# Patient Record
Sex: FEMALE | Race: WHITE | NOT HISPANIC OR LATINO | Employment: UNEMPLOYED | URBAN - METROPOLITAN AREA
[De-identification: names, ages, dates, MRNs, and addresses within clinical notes are randomized per-mention and may not be internally consistent; named-entity substitution may affect disease eponyms.]

---

## 2017-07-12 ENCOUNTER — PATIENT MESSAGE (OUTPATIENT)
Dept: OBSTETRICS AND GYNECOLOGY | Facility: CLINIC | Age: 32
End: 2017-07-12

## 2017-07-14 ENCOUNTER — TELEPHONE (OUTPATIENT)
Dept: OBSTETRICS AND GYNECOLOGY | Facility: CLINIC | Age: 32
End: 2017-07-14

## 2017-07-14 ENCOUNTER — PATIENT MESSAGE (OUTPATIENT)
Dept: OBSTETRICS AND GYNECOLOGY | Facility: CLINIC | Age: 32
End: 2017-07-14

## 2017-07-14 DIAGNOSIS — R30.0 DYSURIA: Primary | ICD-10-CM

## 2017-07-14 RX ORDER — NITROFURANTOIN 25; 75 MG/1; MG/1
100 CAPSULE ORAL 2 TIMES DAILY
Qty: 14 CAPSULE | Refills: 0 | Status: SHIPPED | OUTPATIENT
Start: 2017-07-14 | End: 2017-07-21

## 2017-07-14 NOTE — TELEPHONE ENCOUNTER
Pt is scheduled to drop off urine culture tomorrow morning, but she is requesting abx to treat now. Advised her they may be changed.    Allergies and pharmacy reviewed.

## 2017-07-15 ENCOUNTER — LAB VISIT (OUTPATIENT)
Dept: LAB | Facility: HOSPITAL | Age: 32
End: 2017-07-15
Attending: OBSTETRICS & GYNECOLOGY
Payer: COMMERCIAL

## 2017-07-15 DIAGNOSIS — R30.0 DYSURIA: ICD-10-CM

## 2017-07-15 PROCEDURE — 87077 CULTURE AEROBIC IDENTIFY: CPT

## 2017-07-15 PROCEDURE — 87186 SC STD MICRODIL/AGAR DIL: CPT

## 2017-07-15 PROCEDURE — 87088 URINE BACTERIA CULTURE: CPT

## 2017-07-15 PROCEDURE — 87086 URINE CULTURE/COLONY COUNT: CPT

## 2017-07-18 LAB — BACTERIA UR CULT: NORMAL

## 2017-08-18 DIAGNOSIS — M25.562 PAIN IN BOTH KNEES, UNSPECIFIED CHRONICITY: Primary | ICD-10-CM

## 2017-08-18 DIAGNOSIS — M25.561 PAIN IN BOTH KNEES, UNSPECIFIED CHRONICITY: Primary | ICD-10-CM

## 2017-08-21 ENCOUNTER — OFFICE VISIT (OUTPATIENT)
Dept: ORTHOPEDICS | Facility: CLINIC | Age: 32
End: 2017-08-21
Payer: COMMERCIAL

## 2017-08-21 ENCOUNTER — HOSPITAL ENCOUNTER (OUTPATIENT)
Dept: RADIOLOGY | Facility: HOSPITAL | Age: 32
Discharge: HOME | End: 2017-08-21
Attending: ORTHOPAEDIC SURGERY
Payer: COMMERCIAL

## 2017-08-21 VITALS — BODY MASS INDEX: 27.79 KG/M2 | WEIGHT: 151 LBS | HEIGHT: 62 IN

## 2017-08-21 DIAGNOSIS — M25.561 PAIN IN BOTH KNEES, UNSPECIFIED CHRONICITY: ICD-10-CM

## 2017-08-21 DIAGNOSIS — M25.561 RIGHT KNEE PAIN, UNSPECIFIED CHRONICITY: Primary | ICD-10-CM

## 2017-08-21 DIAGNOSIS — M25.562 PAIN IN BOTH KNEES, UNSPECIFIED CHRONICITY: ICD-10-CM

## 2017-08-21 PROCEDURE — 99999 PR PBB SHADOW E&M-EST. PATIENT-LVL II: CPT | Mod: PBBFAC,,, | Performed by: ORTHOPAEDIC SURGERY

## 2017-08-21 PROCEDURE — 99203 OFFICE O/P NEW LOW 30 MIN: CPT | Mod: S$GLB,,, | Performed by: ORTHOPAEDIC SURGERY

## 2017-08-21 PROCEDURE — 73562 X-RAY EXAM OF KNEE 3: CPT | Mod: TC,50,PO

## 2017-08-21 PROCEDURE — 73562 X-RAY EXAM OF KNEE 3: CPT | Mod: 26,50,, | Performed by: RADIOLOGY

## 2017-08-21 PROCEDURE — 3008F BODY MASS INDEX DOCD: CPT | Mod: S$GLB,,, | Performed by: ORTHOPAEDIC SURGERY

## 2017-08-21 RX ORDER — IBUPROFEN 200 MG
200 TABLET ORAL EVERY 6 HOURS PRN
COMMUNITY
End: 2020-08-10

## 2017-08-21 RX ORDER — DIPHENHYDRAMINE HCL 25 MG
25 CAPSULE ORAL NIGHTLY
COMMUNITY

## 2017-08-21 NOTE — PROGRESS NOTES
Katina Cohen, 32 years old, right knee pain for three months' time, came on   gradually, 1/10 on the pain scale, most bothersome at the end of the day.  Rest   seems to help, not had any formal treatment.    Exam today shows she has full motion, good strength.  She does have tenderness   at the joint line.  Klarissa testing is positive.  Skin is intact.  Compartments   are soft.    X-rays overall show well-preserved joint spacing.    ASSESSMENT:  Right knee pain for three months', possible meniscal tear.    PLAN:  We will schedule her for an MRI of her knee.  See her back after that to   discuss different treatment options.      PBB/PN  dd: 2017 09:32:10 (CDT)  td: 2017 09:46:52 (CDT)  Doc ID   #[id number]  Job ID #[id number]    CC:     Further History  Aching pain  Worse with activity  Relieved with rest  No other associated symptoms  No other radiation    Further Exam  Alert and oriented  Pleasant  Contralateral limb has appropriate range of motion for age and condition  Contralateral limb has appropriate strength for age and condition  Contralateral limb has appropriate stability  for age and condition  No adenopathy  Pulses are appropriate for current condition  Skin is intact        Chief Complaint    Chief Complaint   Patient presents with    Right Knee - Pain       HPI  Katina Cohen is a 32 y.o.  female who presents with       Past Medical History  Past Medical History:   Diagnosis Date    Acid reflux     IBS (irritable bowel syndrome)        Past Surgical History  Past Surgical History:   Procedure Laterality Date     SECTION      3/26/12 at 35 weeks for  labor    FETAL MYELOMENINGOCELE REPAIR HYSTEROTOMY      left arm for birth related brachial plexus injury      TUBAL LIGATION  2014       Medications  Current Outpatient Prescriptions   Medication Sig    diphenhydrAMINE (BENADRYL) 25 mg capsule Take 25 mg by mouth every evening.    ibuprofen (ADVIL,MOTRIN) 200 MG  tablet Take 200 mg by mouth every 6 (six) hours as needed for Pain.    valacyclovir (VALTREX) 1000 MG tablet Take 1 tablet (1,000 mg total) by mouth 2 (two) times daily.     No current facility-administered medications for this visit.        Allergies  Review of patient's allergies indicates:   Allergen Reactions    Sulfa (sulfonamide antibiotics)        Family History  Family History   Problem Relation Age of Onset    Spina bifida Son     Breast cancer Neg Hx     Ovarian cancer Neg Hx        Social History  Social History     Social History    Marital status:      Spouse name: N/A    Number of children: N/A    Years of education: N/A     Occupational History    Not on file.     Social History Main Topics    Smoking status: Never Smoker    Smokeless tobacco: Never Used    Alcohol use No    Drug use: No    Sexual activity: Yes     Partners: Male     Birth control/ protection: OCP     Other Topics Concern    Not on file     Social History Narrative    No narrative on file               Review of Systems     Constitutional: Negative    HENT: Negative  Eyes: Negative  Respiratory: Negative  Cardiovascular: Negative  Musculoskeletal: HPI  Skin: Negative  Neurological: Negative  Hematological: Negative  Endocrine: Negative                 Physical Exam    There were no vitals filed for this visit.  Body mass index is 27.62 kg/m².  Physical Examination:     General appearance -  well appearing, and in no distress  Mental status - awake  Neck - supple  Chest -  symmetric air entry  Heart - normal rate   Abdomen - soft      Assessment     1. Right knee pain, unspecified chronicity          Plan

## 2017-12-05 ENCOUNTER — OFFICE VISIT (OUTPATIENT)
Dept: OBSTETRICS AND GYNECOLOGY | Facility: CLINIC | Age: 32
End: 2017-12-05
Payer: MEDICAID

## 2017-12-05 VITALS
DIASTOLIC BLOOD PRESSURE: 80 MMHG | WEIGHT: 158.75 LBS | BODY MASS INDEX: 29.21 KG/M2 | HEIGHT: 62 IN | SYSTOLIC BLOOD PRESSURE: 116 MMHG

## 2017-12-05 DIAGNOSIS — Z01.419 ENCOUNTER FOR GYNECOLOGICAL EXAMINATION WITHOUT ABNORMAL FINDING: Primary | ICD-10-CM

## 2017-12-05 DIAGNOSIS — N92.0 MENORRHAGIA WITH REGULAR CYCLE: ICD-10-CM

## 2017-12-05 PROCEDURE — 99213 OFFICE O/P EST LOW 20 MIN: CPT | Mod: PBBFAC,PN | Performed by: OBSTETRICS & GYNECOLOGY

## 2017-12-05 PROCEDURE — 99395 PREV VISIT EST AGE 18-39: CPT | Mod: S$PBB,,, | Performed by: OBSTETRICS & GYNECOLOGY

## 2017-12-05 PROCEDURE — 99999 PR PBB SHADOW E&M-EST. PATIENT-LVL III: CPT | Mod: PBBFAC,,, | Performed by: OBSTETRICS & GYNECOLOGY

## 2017-12-05 RX ORDER — HYDROGEN PEROXIDE 3 %
20 SOLUTION, NON-ORAL MISCELLANEOUS
COMMUNITY
End: 2019-03-26

## 2017-12-05 NOTE — PROGRESS NOTES
Chief Complaint   Patient presents with    Well Woman       History of Present Illness: Katina Cohen is a 32 y.o. female that presents today 2017 for well gyn visit. She reports heavy prolonged periods. She reports pad changes every 1 hour for days 2,3,4 with up to 14 days of light bleeding.     Past Medical History:   Diagnosis Date    Acid reflux     IBS (irritable bowel syndrome)        Past Surgical History:   Procedure Laterality Date     SECTION      3/26/12 at 35 weeks for  labor    FETAL MYELOMENINGOCELE REPAIR HYSTEROTOMY      left arm for birth related brachial plexus injury      TUBAL LIGATION  2014       Current Outpatient Prescriptions   Medication Sig Dispense Refill    diphenhydrAMINE (BENADRYL) 25 mg capsule Take 25 mg by mouth every evening.      esomeprazole (NEXIUM) 20 MG capsule Take 20 mg by mouth before breakfast.      ibuprofen (ADVIL,MOTRIN) 200 MG tablet Take 200 mg by mouth every 6 (six) hours as needed for Pain.       No current facility-administered medications for this visit.        Review of patient's allergies indicates:   Allergen Reactions    Sulfa (sulfonamide antibiotics)        Family History   Problem Relation Age of Onset    Spina bifida Son     Breast cancer Neg Hx     Ovarian cancer Neg Hx        Social History     Social History    Marital status:      Spouse name: N/A    Number of children: N/A    Years of education: N/A     Social History Main Topics    Smoking status: Never Smoker    Smokeless tobacco: Never Used    Alcohol use No    Drug use: No    Sexual activity: Yes     Partners: Male     Birth control/ protection: OCP     Other Topics Concern    None     Social History Narrative    None       OB History    Para Term  AB Living   2 2 1 1   2   SAB TAB Ectopic Multiple Live Births           2      # Outcome Date GA Lbr Jewel/2nd Weight Sex Delivery Anes PTL Lv   2 Term 06/23/15 37w0d  2.807 kg (6 lb  "3 oz) M CS-LTranv EPI N JAVAD   1  12 35w0d  2.381 kg (5 lb 4 oz) M CS-LTranv Spinal Y JAVAD      Birth Comments: with second trimester fetal surgery posterior hysterotomy repair           Review of Symptoms:  GENERAL: Denies weight gain or weight loss. Feeling well overall.   SKIN: Denies rash or lesions.   HEAD: Denies head injury or headache.   NODES: Denies enlarged lymph nodes.   CHEST: Denies chest pain or shortness of breath.   CARDIOVASCULAR: Denies palpitations or left sided chest pain.   ABDOMEN: No abdominal pain, constipation, diarrhea, nausea, vomiting or rectal bleeding.   URINARY: No frequency, dysuria, hematuria, or burning on urination.  HEMATOLOGIC: No easy bruisability or excessive bleeding.   MUSCULOSKELETAL: Denies joint pain or swelling.     /80   Ht 5' 2" (1.575 m)   Wt 72 kg (158 lb 11.7 oz)   LMP 2017   Physical Exam:  APPEARANCE: Well nourished, well developed, in no acute distress.  SKIN: Normal skin turgor, no lesions.  NECK: Neck symmetric without masses   RESPIRATORY: Normal respiratory effort with no retractions or use of accessory muscles  CARDIOVASCULAR: Peripheral vascular system with no swelling no varicosities and palpation of pulses normal  LYMPHATIC: No enlargements of the lymph nodes noted in the neck, axillae, or groin  ABDOMEN: Soft. No tenderness or masses. No hepatosplenomegaly. No hernias.  BREASTS: Symmetrical, no skin changes or visible lesions. No palpable masses, nipple discharge or adenopathy bilaterally.  PELVIC: Normal external female genitalia without lesions. Normal hair distribution. Adequate perineal body, normal urethral meatus. Urethra with no masses.  Bladder nontender. Vagina moist and well rugated without lesions or discharge. Cervix pink and without lesions. No significant cystocele or rectocele. Bimanual exam showed uterus normal size, shape, position, mobile and nontender. Adnexa without masses or tenderness. Urethra and bladder " normal.   EXTREMITIES: No clubbing cyanosis or edema.    ASSESSMENT/PLAN:  Encounter for gynecological examination without abnormal finding    Menorrhagia with regular cycle      IUD, Ablation and hysterectomy all discussed     Patient was counseled today on Pap guidelines, recommendation for pelvic exams, mammograms every other year after the age of 40 and annually after the age of 50, Colonoscopy after the age of 50, Dexa Bone Scan and calcium and vitamin D supplementation in menopause and to see her PCP for other health maintenance.   FOLLOW-UP:prn

## 2018-09-05 ENCOUNTER — TELEPHONE (OUTPATIENT)
Dept: OBSTETRICS AND GYNECOLOGY | Facility: CLINIC | Age: 33
End: 2018-09-05

## 2018-09-05 NOTE — TELEPHONE ENCOUNTER
----- Message from Ethel Medina sent at 9/5/2018 10:45 AM CDT -----  Contact: Self  Patient needs to schedule a Well woman exam  Epic wont let me schedule     Please call [phone] (home)

## 2018-10-19 ENCOUNTER — PATIENT MESSAGE (OUTPATIENT)
Dept: OBSTETRICS AND GYNECOLOGY | Facility: CLINIC | Age: 33
End: 2018-10-19

## 2018-10-22 RX ORDER — FLUCONAZOLE 150 MG/1
150 TABLET ORAL ONCE
Qty: 1 TABLET | Refills: 0 | Status: SHIPPED | OUTPATIENT
Start: 2018-10-22 | End: 2018-10-22

## 2018-10-23 ENCOUNTER — PATIENT MESSAGE (OUTPATIENT)
Dept: OBSTETRICS AND GYNECOLOGY | Facility: CLINIC | Age: 33
End: 2018-10-23

## 2018-12-06 ENCOUNTER — OFFICE VISIT (OUTPATIENT)
Dept: OBSTETRICS AND GYNECOLOGY | Facility: CLINIC | Age: 33
End: 2018-12-06
Payer: COMMERCIAL

## 2018-12-06 VITALS
DIASTOLIC BLOOD PRESSURE: 86 MMHG | BODY MASS INDEX: 29.88 KG/M2 | WEIGHT: 163.38 LBS | SYSTOLIC BLOOD PRESSURE: 122 MMHG

## 2018-12-06 DIAGNOSIS — N76.2 ACUTE VULVITIS: ICD-10-CM

## 2018-12-06 DIAGNOSIS — Z12.4 ENCOUNTER FOR PAP SMEAR OF CERVIX WITH HPV DNA COTESTING: Primary | ICD-10-CM

## 2018-12-06 LAB
CANDIDA RRNA VAG QL PROBE: NEGATIVE
G VAGINALIS RRNA GENITAL QL PROBE: NEGATIVE
T VAGINALIS RRNA GENITAL QL PROBE: NEGATIVE

## 2018-12-06 PROCEDURE — 88175 CYTOPATH C/V AUTO FLUID REDO: CPT

## 2018-12-06 PROCEDURE — 87660 TRICHOMONAS VAGIN DIR PROBE: CPT

## 2018-12-06 PROCEDURE — 87624 HPV HI-RISK TYP POOLED RSLT: CPT

## 2018-12-06 PROCEDURE — 99395 PREV VISIT EST AGE 18-39: CPT | Mod: S$GLB,,, | Performed by: OBSTETRICS & GYNECOLOGY

## 2018-12-06 PROCEDURE — 99999 PR PBB SHADOW E&M-EST. PATIENT-LVL III: CPT | Mod: PBBFAC,,, | Performed by: OBSTETRICS & GYNECOLOGY

## 2018-12-06 NOTE — PROGRESS NOTES
Chief Complaint   Patient presents with    Well Woman    Dyspareunia     pt states she had 3 months of painful intercourse, it has since resolved-- it felt better after she took diflucan        History of Present Illness: Katina Cohen is a 33 y.o. female that presents today 2018 for well gyn visit.    Past Medical History:   Diagnosis Date    Acid reflux     IBS (irritable bowel syndrome)        Past Surgical History:   Procedure Laterality Date     SECTION      3/26/12 at 35 weeks for  labor    FETAL MYELOMENINGOCELE REPAIR HYSTEROTOMY      left arm for birth related brachial plexus injury      TUBAL LIGATION  2014       Current Outpatient Medications   Medication Sig Dispense Refill    diphenhydrAMINE (BENADRYL) 25 mg capsule Take 25 mg by mouth every evening.      esomeprazole (NEXIUM) 20 MG capsule Take 20 mg by mouth before breakfast.      ibuprofen (ADVIL,MOTRIN) 200 MG tablet Take 200 mg by mouth every 6 (six) hours as needed for Pain.       No current facility-administered medications for this visit.        Review of patient's allergies indicates:   Allergen Reactions    Sulfa (sulfonamide antibiotics)        Family History   Problem Relation Age of Onset    Spina bifida Son     Breast cancer Neg Hx     Ovarian cancer Neg Hx        Social History     Socioeconomic History    Marital status:      Spouse name: None    Number of children: None    Years of education: None    Highest education level: None   Social Needs    Financial resource strain: None    Food insecurity - worry: None    Food insecurity - inability: None    Transportation needs - medical: None    Transportation needs - non-medical: None   Occupational History    None   Tobacco Use    Smoking status: Never Smoker    Smokeless tobacco: Never Used   Substance and Sexual Activity    Alcohol use: No    Drug use: No    Sexual activity: Yes     Partners: Male     Birth  control/protection: OCP   Other Topics Concern    None   Social History Narrative    None       OB History    Para Term  AB Living   2 2 1 1   2   SAB TAB Ectopic Multiple Live Births           2      # Outcome Date GA Lbr Jewel/2nd Weight Sex Delivery Anes PTL Lv   2 Term 06/23/15 37w0d  2.807 kg (6 lb 3 oz) M CS-LTranv EPI N JAVAD   1  12 35w0d  2.381 kg (5 lb 4 oz) M CS-LTranv Spinal Y JAVAD      Birth Comments: with second trimester fetal surgery posterior hysterotomy repair           Review of Symptoms:  GENERAL: Denies weight gain or weight loss. Feeling well overall.   SKIN: Denies rash or lesions.   HEAD: Denies head injury or headache.   NODES: Denies enlarged lymph nodes.   CHEST: Denies chest pain or shortness of breath.   CARDIOVASCULAR: Denies palpitations or left sided chest pain.   ABDOMEN: No abdominal pain, constipation, diarrhea, nausea, vomiting or rectal bleeding.   URINARY: No frequency, dysuria, hematuria, or burning on urination.  HEMATOLOGIC: No easy bruisability or excessive bleeding.   MUSCULOSKELETAL: Denies joint pain or swelling.     /86   Wt 74.1 kg (163 lb 5.8 oz)   LMP 2018   Physical Exam:  APPEARANCE: Well nourished, well developed, in no acute distress.  SKIN: Normal skin turgor, no lesions.  NECK: Neck symmetric without masses   RESPIRATORY: Normal respiratory effort with no retractions or use of accessory muscles  CARDIOVASCULAR: Peripheral vascular system with no swelling no varicosities and palpation of pulses normal  LYMPHATIC: No enlargements of the lymph nodes noted in the neck, axillae, or groin  ABDOMEN: Soft. No tenderness or masses. No hepatosplenomegaly. No hernias.  BREASTS: Symmetrical, no skin changes or visible lesions. No palpable masses, nipple discharge or adenopathy bilaterally.  PELVIC: Normal external female genitalia without lesions. Normal hair distribution. Adequate perineal body, normal urethral meatus. Urethra with no  masses.  Bladder nontender. Vagina moist and well rugated without lesions or discharge. Cervix pink and without lesions. No significant cystocele or rectocele. Bimanual exam showed uterus normal size, shape, position, mobile and nontender. Adnexa without masses or tenderness. Urethra and bladder normal.   EXTREMITIES: No clubbing cyanosis or edema.    ASSESSMENT/PLAN:  Encounter for Pap smear of cervix with HPV DNA cotesting  -     Liquid-based pap smear, screening  -     HPV High Risk Genotypes, PCR    Acute vulvitis  -     Vaginosis Screen by DNA Probe          Patient was counseled today on Pap guidelines, recommendation for pelvic exams, mammograms every other year after the age of 40 and annually after the age of 50, Colonoscopy after the age of 50, Dexa Bone Scan and calcium and vitamin D supplementation in menopause and to see her PCP for other health maintenance.   FOLLOW-UP:prn

## 2018-12-12 LAB
HPV HR 12 DNA CVX QL NAA+PROBE: NEGATIVE
HPV16 AG SPEC QL: NEGATIVE
HPV18 DNA SPEC QL NAA+PROBE: NEGATIVE

## 2019-01-29 ENCOUNTER — PATIENT MESSAGE (OUTPATIENT)
Dept: OBSTETRICS AND GYNECOLOGY | Facility: CLINIC | Age: 34
End: 2019-01-29

## 2019-01-29 DIAGNOSIS — R39.89 POSSIBLE URINARY TRACT INFECTION: Primary | ICD-10-CM

## 2019-01-30 ENCOUNTER — PATIENT MESSAGE (OUTPATIENT)
Dept: OBSTETRICS AND GYNECOLOGY | Facility: CLINIC | Age: 34
End: 2019-01-30

## 2019-01-30 ENCOUNTER — LAB VISIT (OUTPATIENT)
Dept: LAB | Facility: HOSPITAL | Age: 34
End: 2019-01-30
Attending: OBSTETRICS & GYNECOLOGY
Payer: COMMERCIAL

## 2019-01-30 DIAGNOSIS — R39.89 POSSIBLE URINARY TRACT INFECTION: ICD-10-CM

## 2019-01-30 LAB
BACTERIA #/AREA URNS HPF: ABNORMAL /HPF
BILIRUB UR QL STRIP: NEGATIVE
CLARITY UR: ABNORMAL
COLOR UR: YELLOW
GLUCOSE UR QL STRIP: NEGATIVE
HGB UR QL STRIP: ABNORMAL
KETONES UR QL STRIP: NEGATIVE
LEUKOCYTE ESTERASE UR QL STRIP: ABNORMAL
MICROSCOPIC COMMENT: ABNORMAL
NITRITE UR QL STRIP: NEGATIVE
PH UR STRIP: 7 [PH] (ref 5–8)
PROT UR QL STRIP: NEGATIVE
RBC #/AREA URNS HPF: 5 /HPF (ref 0–4)
SP GR UR STRIP: 1.01 (ref 1–1.03)
URN SPEC COLLECT METH UR: ABNORMAL
WBC #/AREA URNS HPF: 20 /HPF (ref 0–5)

## 2019-01-30 PROCEDURE — 87088 URINE BACTERIA CULTURE: CPT

## 2019-01-30 PROCEDURE — 87077 CULTURE AEROBIC IDENTIFY: CPT

## 2019-01-30 PROCEDURE — 81000 URINALYSIS NONAUTO W/SCOPE: CPT | Mod: PO

## 2019-01-30 PROCEDURE — 87186 SC STD MICRODIL/AGAR DIL: CPT

## 2019-01-30 PROCEDURE — 87086 URINE CULTURE/COLONY COUNT: CPT

## 2019-01-31 ENCOUNTER — PATIENT MESSAGE (OUTPATIENT)
Dept: OBSTETRICS AND GYNECOLOGY | Facility: CLINIC | Age: 34
End: 2019-01-31

## 2019-01-31 RX ORDER — NITROFURANTOIN 25; 75 MG/1; MG/1
100 CAPSULE ORAL 2 TIMES DAILY
Qty: 14 CAPSULE | Refills: 0 | Status: SHIPPED | OUTPATIENT
Start: 2019-01-31 | End: 2019-02-07

## 2019-02-01 ENCOUNTER — PATIENT MESSAGE (OUTPATIENT)
Dept: OBSTETRICS AND GYNECOLOGY | Facility: CLINIC | Age: 34
End: 2019-02-01

## 2019-02-01 LAB — BACTERIA UR CULT: NORMAL

## 2019-03-25 ENCOUNTER — PATIENT MESSAGE (OUTPATIENT)
Dept: OBSTETRICS AND GYNECOLOGY | Facility: CLINIC | Age: 34
End: 2019-03-25

## 2019-03-26 ENCOUNTER — OFFICE VISIT (OUTPATIENT)
Dept: OBSTETRICS AND GYNECOLOGY | Facility: CLINIC | Age: 34
End: 2019-03-26
Payer: COMMERCIAL

## 2019-03-26 VITALS
HEIGHT: 62 IN | WEIGHT: 164 LBS | BODY MASS INDEX: 30.18 KG/M2 | DIASTOLIC BLOOD PRESSURE: 62 MMHG | SYSTOLIC BLOOD PRESSURE: 110 MMHG

## 2019-03-26 DIAGNOSIS — R30.0 DYSURIA: ICD-10-CM

## 2019-03-26 DIAGNOSIS — L25.9 CONTACT DERMATITIS, UNSPECIFIED CONTACT DERMATITIS TYPE, UNSPECIFIED TRIGGER: Primary | ICD-10-CM

## 2019-03-26 DIAGNOSIS — L70.9 ACNE, UNSPECIFIED ACNE TYPE: ICD-10-CM

## 2019-03-26 DIAGNOSIS — N92.0 MENORRHAGIA WITH REGULAR CYCLE: ICD-10-CM

## 2019-03-26 PROCEDURE — 87510 GARDNER VAG DNA DIR PROBE: CPT

## 2019-03-26 PROCEDURE — 3008F BODY MASS INDEX DOCD: CPT | Mod: CPTII,S$GLB,, | Performed by: OBSTETRICS & GYNECOLOGY

## 2019-03-26 PROCEDURE — 3008F PR BODY MASS INDEX (BMI) DOCUMENTED: ICD-10-PCS | Mod: CPTII,S$GLB,, | Performed by: OBSTETRICS & GYNECOLOGY

## 2019-03-26 PROCEDURE — 87086 URINE CULTURE/COLONY COUNT: CPT

## 2019-03-26 PROCEDURE — 99213 PR OFFICE/OUTPT VISIT, EST, LEVL III, 20-29 MIN: ICD-10-PCS | Mod: S$GLB,,, | Performed by: OBSTETRICS & GYNECOLOGY

## 2019-03-26 PROCEDURE — 99213 OFFICE O/P EST LOW 20 MIN: CPT | Mod: S$GLB,,, | Performed by: OBSTETRICS & GYNECOLOGY

## 2019-03-26 PROCEDURE — 99999 PR PBB SHADOW E&M-EST. PATIENT-LVL III: ICD-10-PCS | Mod: PBBFAC,,, | Performed by: OBSTETRICS & GYNECOLOGY

## 2019-03-26 PROCEDURE — 99999 PR PBB SHADOW E&M-EST. PATIENT-LVL III: CPT | Mod: PBBFAC,,, | Performed by: OBSTETRICS & GYNECOLOGY

## 2019-03-26 PROCEDURE — 87480 CANDIDA DNA DIR PROBE: CPT

## 2019-03-26 RX ORDER — NORETHINDRONE ACETATE AND ETHINYL ESTRADIOL 1MG-20(21)
1 KIT ORAL DAILY
Qty: 30 TABLET | Refills: 11 | Status: SHIPPED | OUTPATIENT
Start: 2019-03-26 | End: 2019-08-20 | Stop reason: ALTCHOICE

## 2019-03-26 RX ORDER — TRIAMCINOLONE ACETONIDE 1 MG/G
CREAM TOPICAL 2 TIMES DAILY
Qty: 80 G | Refills: 1 | Status: SHIPPED | OUTPATIENT
Start: 2019-03-26 | End: 2019-09-10

## 2019-03-26 NOTE — PROGRESS NOTES
Chief Complaint   Patient presents with    possible yeast    possible UTI       History of Present Illness: Katina Cohen is a 33 y.o. female that presents today 3/26/2019 for   Chief Complaint   Patient presents with    possible yeast    possible UTI         Past Medical History:   Diagnosis Date    Acid reflux     IBS (irritable bowel syndrome)        Past Surgical History:   Procedure Laterality Date     SECTION      3/26/12 at 35 weeks for  labor    FETAL MYELOMENINGOCELE REPAIR HYSTEROTOMY      left arm for birth related brachial plexus injury      TUBAL LIGATION  2014       Current Outpatient Medications   Medication Sig Dispense Refill    diphenhydrAMINE (BENADRYL) 25 mg capsule Take 25 mg by mouth every evening.      ibuprofen (ADVIL,MOTRIN) 200 MG tablet Take 200 mg by mouth every 6 (six) hours as needed for Pain.      norethindrone-ethinyl estradiol (JUNEL FE 1/20) 1 mg-20 mcg (21)/75 mg (7) per tablet Take 1 tablet by mouth once daily. 30 tablet 11    triamcinolone acetonide 0.1% (KENALOG) 0.1 % cream Apply topically 2 (two) times daily. 45 g 1     No current facility-administered medications for this visit.        Review of patient's allergies indicates:   Allergen Reactions    Sulfa (sulfonamide antibiotics)        Family History   Problem Relation Age of Onset    Spina bifida Son     Breast cancer Neg Hx     Ovarian cancer Neg Hx        Social History     Tobacco Use    Smoking status: Never Smoker    Smokeless tobacco: Never Used   Substance Use Topics    Alcohol use: No    Drug use: No       OB History    Para Term  AB Living   2 2 1 1   2   SAB TAB Ectopic Multiple Live Births           2      # Outcome Date GA Lbr Jewel/2nd Weight Sex Delivery Anes PTL Lv   2 Term 06/23/15 37w0d  2.807 kg (6 lb 3 oz) M CS-LTranv EPI N JAVAD   1  12 35w0d  2.381 kg (5 lb 4 oz) M CS-LTranv Spinal Y JAVAD      Birth Comments: with second  "trimester fetal surgery posterior hysterotomy repair        Review of Symptoms:  GENERAL: Denies weight gain or weight loss. Feeling well overall.   SKIN: Denies rash or lesions.   HEAD: Denies head injury or headache.   NODES: Denies enlarged lymph nodes.   CHEST: Denies chest pain or shortness of breath.   CARDIOVASCULAR: Denies palpitations or left sided chest pain.   ABDOMEN: No abdominal pain, constipation, diarrhea, nausea, vomiting or rectal bleeding.   URINARY: No frequency, dysuria, hematuria, or burning on urination.  HEMATOLOGIC: No easy bruisability or excessive bleeding.   MUSCULOSKELETAL: Denies joint pain or swelling.     /62   Ht 5' 2" (1.575 m)   Wt 74.4 kg (164 lb 0.4 oz)   LMP 03/12/2019   Physical Exam:  APPEARANCE: Well nourished, well developed, in no acute distress.  SKIN: Normal skin turgor, no lesions.  NECK: Neck symmetric without masses   RESPIRATORY: Normal respiratory effort with no retractions or use of accessory muscles  CARDIOVASCULAR: Peripheral vascular system with no swelling no varicosities and palpation of pulses normal  LYMPHATIC: No enlargements of the lymph nodes noted in the neck, axillae, or groin  ABDOMEN: Soft. No tenderness or masses. No hepatosplenomegaly. No hernias.  PELVIC: Normal external female genitalia with erythema of the labia +++   Normal hair distribution. Adequate perineal body, normal urethral meatus. Urethra with no masses.  Bladder nontender. Vagina moist and well rugated without lesions or discharge. Cervix pink and without lesions. No significant cystocele or rectocele. Bimanual exam showed uterus normal size, shape, position, mobile and nontender. Adnexa without masses or tenderness. Urethra and bladder normal.  EXTREMITIES: No clubbing cyanosis or edema.    ASSESSMENT/PLAN:  Contact dermatitis, unspecified contact dermatitis type, unspecified trigger  -     triamcinolone acetonide 0.1% (KENALOG) 0.1 % cream; Apply topically 2 (two) times " daily.  Dispense: 45 g; Refill: 1    Dysuria  -     Urine culture  -     Vaginosis Screen by DNA Probe    Acne, unspecified acne type  -     norethindrone-ethinyl estradiol (JUNEL FE 1/20) 1 mg-20 mcg (21)/75 mg (7) per tablet; Take 1 tablet by mouth once daily.  Dispense: 30 tablet; Refill: 11    Menorrhagia with regular cycle  -     norethindrone-ethinyl estradiol (JUNEL FE 1/20) 1 mg-20 mcg (21)/75 mg (7) per tablet; Take 1 tablet by mouth once daily.  Dispense: 30 tablet; Refill: 11        15 minutes spent today with this patient. Greater than half spent in counseling today.

## 2019-03-27 LAB
BACTERIA UR CULT: NO GROWTH
BACTERIAL VAGINOSIS DNA: NEGATIVE
CANDIDA GLABRATA DNA: NEGATIVE
CANDIDA KRUSEI DNA: NEGATIVE
CANDIDA RRNA VAG QL PROBE: POSITIVE
T VAGINALIS RRNA GENITAL QL PROBE: NEGATIVE

## 2019-04-03 ENCOUNTER — PATIENT MESSAGE (OUTPATIENT)
Dept: OBSTETRICS AND GYNECOLOGY | Facility: CLINIC | Age: 34
End: 2019-04-03

## 2019-04-04 ENCOUNTER — PATIENT MESSAGE (OUTPATIENT)
Dept: OBSTETRICS AND GYNECOLOGY | Facility: CLINIC | Age: 34
End: 2019-04-04

## 2019-04-04 RX ORDER — TERCONAZOLE 4 MG/G
1 CREAM VAGINAL NIGHTLY
Qty: 45 G | Refills: 0 | Status: SHIPPED | OUTPATIENT
Start: 2019-04-04 | End: 2019-04-16

## 2019-04-05 ENCOUNTER — PATIENT MESSAGE (OUTPATIENT)
Dept: OBSTETRICS AND GYNECOLOGY | Facility: CLINIC | Age: 34
End: 2019-04-05

## 2019-09-04 ENCOUNTER — PATIENT MESSAGE (OUTPATIENT)
Dept: OBSTETRICS AND GYNECOLOGY | Facility: CLINIC | Age: 34
End: 2019-09-04

## 2019-09-04 DIAGNOSIS — R39.89 POSSIBLE URINARY TRACT INFECTION: Primary | ICD-10-CM

## 2019-09-05 ENCOUNTER — LAB VISIT (OUTPATIENT)
Dept: LAB | Facility: HOSPITAL | Age: 34
End: 2019-09-05
Attending: OBSTETRICS & GYNECOLOGY
Payer: COMMERCIAL

## 2019-09-05 ENCOUNTER — PATIENT MESSAGE (OUTPATIENT)
Dept: OBSTETRICS AND GYNECOLOGY | Facility: CLINIC | Age: 34
End: 2019-09-05

## 2019-09-05 DIAGNOSIS — R39.89 POSSIBLE URINARY TRACT INFECTION: ICD-10-CM

## 2019-09-05 PROCEDURE — 87086 URINE CULTURE/COLONY COUNT: CPT

## 2019-09-06 LAB — BACTERIA UR CULT: NORMAL

## 2019-09-06 RX ORDER — NITROFURANTOIN 25; 75 MG/1; MG/1
100 CAPSULE ORAL 2 TIMES DAILY
Qty: 14 CAPSULE | Refills: 0 | Status: SHIPPED | OUTPATIENT
Start: 2019-09-06 | End: 2019-09-13

## 2019-09-10 ENCOUNTER — OFFICE VISIT (OUTPATIENT)
Dept: OBSTETRICS AND GYNECOLOGY | Facility: CLINIC | Age: 34
End: 2019-09-10
Payer: COMMERCIAL

## 2019-09-10 ENCOUNTER — LAB VISIT (OUTPATIENT)
Dept: LAB | Facility: HOSPITAL | Age: 34
End: 2019-09-10
Attending: OBSTETRICS & GYNECOLOGY
Payer: COMMERCIAL

## 2019-09-10 ENCOUNTER — PATIENT MESSAGE (OUTPATIENT)
Dept: OBSTETRICS AND GYNECOLOGY | Facility: CLINIC | Age: 34
End: 2019-09-10

## 2019-09-10 VITALS
HEIGHT: 62 IN | DIASTOLIC BLOOD PRESSURE: 82 MMHG | BODY MASS INDEX: 30.07 KG/M2 | SYSTOLIC BLOOD PRESSURE: 122 MMHG | WEIGHT: 163.38 LBS

## 2019-09-10 DIAGNOSIS — R39.15 URINARY URGENCY: Primary | ICD-10-CM

## 2019-09-10 DIAGNOSIS — N92.0 MENORRHAGIA WITH REGULAR CYCLE: ICD-10-CM

## 2019-09-10 DIAGNOSIS — L70.9 ACNE, UNSPECIFIED ACNE TYPE: ICD-10-CM

## 2019-09-10 DIAGNOSIS — N92.1 MENORRHAGIA WITH IRREGULAR CYCLE: ICD-10-CM

## 2019-09-10 DIAGNOSIS — D21.9 FIBROID: ICD-10-CM

## 2019-09-10 LAB
ALBUMIN SERPL BCP-MCNC: 4.3 G/DL (ref 3.5–5.2)
ALP SERPL-CCNC: 62 U/L (ref 55–135)
ALT SERPL W/O P-5'-P-CCNC: 21 U/L (ref 10–44)
ANION GAP SERPL CALC-SCNC: 10 MMOL/L (ref 8–16)
AST SERPL-CCNC: 25 U/L (ref 10–40)
BASOPHILS # BLD AUTO: 0.02 K/UL (ref 0–0.2)
BASOPHILS NFR BLD: 0.3 % (ref 0–1.9)
BILIRUB SERPL-MCNC: 0.2 MG/DL (ref 0.1–1)
BUN SERPL-MCNC: 11 MG/DL (ref 6–20)
CALCIUM SERPL-MCNC: 9.9 MG/DL (ref 8.7–10.5)
CHLORIDE SERPL-SCNC: 104 MMOL/L (ref 95–110)
CO2 SERPL-SCNC: 22 MMOL/L (ref 23–29)
CREAT SERPL-MCNC: 0.8 MG/DL (ref 0.5–1.4)
DIFFERENTIAL METHOD: ABNORMAL
EOSINOPHIL # BLD AUTO: 0.2 K/UL (ref 0–0.5)
EOSINOPHIL NFR BLD: 3.1 % (ref 0–8)
ERYTHROCYTE [DISTWIDTH] IN BLOOD BY AUTOMATED COUNT: 12 % (ref 11.5–14.5)
EST. GFR  (AFRICAN AMERICAN): >60 ML/MIN/1.73 M^2
EST. GFR  (NON AFRICAN AMERICAN): >60 ML/MIN/1.73 M^2
GLUCOSE SERPL-MCNC: 98 MG/DL (ref 70–110)
HCT VFR BLD AUTO: 38.8 % (ref 37–48.5)
HGB BLD-MCNC: 12.8 G/DL (ref 12–16)
IMM GRANULOCYTES # BLD AUTO: 0.01 K/UL (ref 0–0.04)
IMM GRANULOCYTES NFR BLD AUTO: 0.2 % (ref 0–0.5)
IRON SERPL-MCNC: 75 UG/DL (ref 30–160)
LYMPHOCYTES # BLD AUTO: 2.1 K/UL (ref 1–4.8)
LYMPHOCYTES NFR BLD: 33.2 % (ref 18–48)
MCH RBC QN AUTO: 31.6 PG (ref 27–31)
MCHC RBC AUTO-ENTMCNC: 33 G/DL (ref 32–36)
MCV RBC AUTO: 96 FL (ref 82–98)
MONOCYTES # BLD AUTO: 0.6 K/UL (ref 0.3–1)
MONOCYTES NFR BLD: 8.9 % (ref 4–15)
NEUTROPHILS # BLD AUTO: 3.4 K/UL (ref 1.8–7.7)
NEUTROPHILS NFR BLD: 54.3 % (ref 38–73)
NRBC BLD-RTO: 0 /100 WBC
PLATELET # BLD AUTO: 277 K/UL (ref 150–350)
PMV BLD AUTO: 10.9 FL (ref 9.2–12.9)
POTASSIUM SERPL-SCNC: 3.9 MMOL/L (ref 3.5–5.1)
PROT SERPL-MCNC: 8.1 G/DL (ref 6–8.4)
RBC # BLD AUTO: 4.05 M/UL (ref 4–5.4)
SATURATED IRON: 15 % (ref 20–50)
SODIUM SERPL-SCNC: 136 MMOL/L (ref 136–145)
TOTAL IRON BINDING CAPACITY: 488 UG/DL (ref 250–450)
TRANSFERRIN SERPL-MCNC: 330 MG/DL (ref 200–375)
WBC # BLD AUTO: 6.2 K/UL (ref 3.9–12.7)

## 2019-09-10 PROCEDURE — 99999 PR PBB SHADOW E&M-EST. PATIENT-LVL III: CPT | Mod: PBBFAC,,, | Performed by: OBSTETRICS & GYNECOLOGY

## 2019-09-10 PROCEDURE — 3008F BODY MASS INDEX DOCD: CPT | Mod: CPTII,S$GLB,, | Performed by: OBSTETRICS & GYNECOLOGY

## 2019-09-10 PROCEDURE — 3008F PR BODY MASS INDEX (BMI) DOCUMENTED: ICD-10-PCS | Mod: CPTII,S$GLB,, | Performed by: OBSTETRICS & GYNECOLOGY

## 2019-09-10 PROCEDURE — 99213 PR OFFICE/OUTPT VISIT, EST, LEVL III, 20-29 MIN: ICD-10-PCS | Mod: S$GLB,,, | Performed by: OBSTETRICS & GYNECOLOGY

## 2019-09-10 PROCEDURE — 36415 COLL VENOUS BLD VENIPUNCTURE: CPT | Mod: PO

## 2019-09-10 PROCEDURE — 80053 COMPREHEN METABOLIC PANEL: CPT

## 2019-09-10 PROCEDURE — 85025 COMPLETE CBC W/AUTO DIFF WBC: CPT

## 2019-09-10 PROCEDURE — 99213 OFFICE O/P EST LOW 20 MIN: CPT | Mod: S$GLB,,, | Performed by: OBSTETRICS & GYNECOLOGY

## 2019-09-10 PROCEDURE — 99999 PR PBB SHADOW E&M-EST. PATIENT-LVL III: ICD-10-PCS | Mod: PBBFAC,,, | Performed by: OBSTETRICS & GYNECOLOGY

## 2019-09-10 PROCEDURE — 83540 ASSAY OF IRON: CPT

## 2019-09-10 PROCEDURE — 84443 ASSAY THYROID STIM HORMONE: CPT

## 2019-09-10 NOTE — PROGRESS NOTES
Chief Complaint   Patient presents with    left sample for possible UTI last week    started antibiotic friday    still in pain       History of Present Illness: Katina Cohen is a 34 y.o. female that presents today 9/10/2019 for   Chief Complaint   Patient presents with    left sample for possible UTI last week    started antibiotic friday    still in pain   she reports no relief from ABX. She reports urgency and frequency of urination and dysuria.  She reports that she is having more issues with her periods.  She reports bleeding for up to 2 weeks.  She reports that she bleeds very heavily.  She reports ovulation pain is causing her pelvic pain and rectum pain. She reports changing tampon and pad every 30 minutes for first 3 days with more clotting.  She reports history of fibroid      Past Medical History:   Diagnosis Date    Acid reflux     IBS (irritable bowel syndrome)        Past Surgical History:   Procedure Laterality Date     SECTION      3/26/12 at 35 weeks for  labor    FETAL MYELOMENINGOCELE REPAIR HYSTEROTOMY      left arm for birth related brachial plexus injury      TUBAL LIGATION  2014       Current Outpatient Medications   Medication Sig Dispense Refill    diphenhydrAMINE (BENADRYL) 25 mg capsule Take 25 mg by mouth every evening.      ibuprofen (ADVIL,MOTRIN) 200 MG tablet Take 200 mg by mouth every 6 (six) hours as needed for Pain.      phenazopyridine HCl (AZO ORAL) Take by mouth.      nitrofurantoin, macrocrystal-monohydrate, (MACROBID) 100 MG capsule Take 1 capsule (100 mg total) by mouth 2 (two) times daily for 7 days 14 capsule 0     No current facility-administered medications for this visit.        Review of patient's allergies indicates:   Allergen Reactions    Sulfa (sulfonamide antibiotics)        Family History   Problem Relation Age of Onset    Spina bifida Son     Breast cancer Neg Hx     Ovarian cancer Neg Hx        Social History  "    Tobacco Use    Smoking status: Never Smoker    Smokeless tobacco: Never Used   Substance Use Topics    Alcohol use: No    Drug use: No       OB History    Para Term  AB Living   2 2 1 1   2   SAB TAB Ectopic Multiple Live Births           2      # Outcome Date GA Lbr Jewel/2nd Weight Sex Delivery Anes PTL Lv   2 Term 06/23/15 37w0d  2.807 kg (6 lb 3 oz) M CS-LTranv EPI N JAVAD   1  12 35w0d  2.381 kg (5 lb 4 oz) M CS-LTranv Spinal Y JAVAD      Birth Comments: with second trimester fetal surgery posterior hysterotomy repair        Review of Symptoms:  GENERAL: Denies weight gain or weight loss. Feeling well overall.   SKIN: Denies rash or lesions.   HEAD: Denies head injury or headache.   NODES: Denies enlarged lymph nodes.   CHEST: Denies chest pain or shortness of breath.   CARDIOVASCULAR: Denies palpitations or left sided chest pain.   ABDOMEN: No abdominal pain, constipation, diarrhea, nausea, vomiting or rectal bleeding.   URINARY: ++ urgency and frequency with  dysuria, no hematuria  HEMATOLOGIC: No easy bruisability or excessive bleeding.   MUSCULOSKELETAL: Denies joint pain or swelling.     /82   Ht 5' 2" (1.575 m)   Wt 74.1 kg (163 lb 5.8 oz)   LMP 2019     ASSESSMENT/PLAN:  Urinary urgency    Menorrhagia with irregular cycle  -     CBC auto differential; Future; Expected date: 09/10/2019  -     Iron and TIBC; Future; Expected date: 09/10/2019  -     Comprehensive metabolic panel; Future; Expected date: 09/10/2019  -     TSH; Future; Expected date: 09/10/2019  -     US Pelvis Comp with Transvag NON-OB (xpd; Future; Expected date: 09/10/2019    Fibroid  -     US Pelvis Comp with Transvag NON-OB (xpd; Future; Expected date: 09/10/2019      15 minutes spent today with this patient. Greater than half spent in counseling today.         "

## 2019-09-11 LAB — TSH SERPL DL<=0.005 MIU/L-ACNC: 1.15 UIU/ML (ref 0.4–4)

## 2019-09-12 ENCOUNTER — HOSPITAL ENCOUNTER (OUTPATIENT)
Dept: RADIOLOGY | Facility: HOSPITAL | Age: 34
Discharge: HOME | End: 2019-09-12
Attending: OBSTETRICS & GYNECOLOGY
Payer: COMMERCIAL

## 2019-09-12 DIAGNOSIS — N92.1 MENORRHAGIA WITH IRREGULAR CYCLE: ICD-10-CM

## 2019-09-12 DIAGNOSIS — D21.9 FIBROID: ICD-10-CM

## 2019-09-12 PROCEDURE — 76830 US PELVIS COMP WITH TRANSVAG NON-OB (XPD): ICD-10-PCS | Mod: 26,,, | Performed by: RADIOLOGY

## 2019-09-12 PROCEDURE — 76830 TRANSVAGINAL US NON-OB: CPT | Mod: 26,,, | Performed by: RADIOLOGY

## 2019-09-12 PROCEDURE — 76856 US PELVIS COMP WITH TRANSVAG NON-OB (XPD): ICD-10-PCS | Mod: 26,,, | Performed by: RADIOLOGY

## 2019-09-12 PROCEDURE — 76830 TRANSVAGINAL US NON-OB: CPT | Mod: TC,PN

## 2019-09-12 PROCEDURE — 76856 US EXAM PELVIC COMPLETE: CPT | Mod: 26,,, | Performed by: RADIOLOGY

## 2019-09-13 RX ORDER — DIAZEPAM 5 MG/1
TABLET ORAL
Qty: 1 TABLET | Refills: 0 | Status: SHIPPED | OUTPATIENT
Start: 2019-09-13 | End: 2020-04-28

## 2019-09-17 RX ORDER — NORETHINDRONE ACETATE AND ETHINYL ESTRADIOL 1MG-20(21)
1 KIT ORAL DAILY
Qty: 30 TABLET | Refills: 11 | Status: SHIPPED | OUTPATIENT
Start: 2019-09-17 | End: 2019-12-02 | Stop reason: SDUPTHER

## 2019-10-14 NOTE — PROGRESS NOTES
Note:  Patient is the daughter of Gerald Chang    CHIEF COMPLAINT:    Mrs. Cohen is a 34 y.o. female presenting for a consultation at the request of Dr. Shavon Mascorro. Patient presents with urinary urgency.    PRESENTING ILLNESS:    Katina Cohen is a 34 y.o. female who states she has isolated urinary urgency.  She states she had the first UTI she ever had in January of this year.  She had urgency, frequency symptoms, was treated with abx.  She felt like symptoms never fully resolved.  She then had vaginal irritation and was treated with 3 rounds of diflucan and the vaginitis symptoms resolved.  She then felt like she had another UTI, and pushed fluids then had isolated urgency.  She states she does not have to urinate frequently, that she often does not drink enough fluids as she is busy with her 5 year old and 7 year old sons.  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  Though she has a family history of IC, she has no dyspareunia, the bladder does not feel better with emptying.  It actually feels better when full.  She denies any gross hematuria, other pain issues.  She is very stressed out stating that they are now living with her sister, though she and her  are moving with their sons to a new home.  She presently has frequency x 4-5, the urgency gets worse between 4 and 7 pm.  She changed her diet to a gluten free diet.  She also states that she stopped drinking coffee.  She is very stressed out.      REVIEW OF SYSTEMS:    Review of Systems   Constitutional: Negative.    HENT: Negative.    Eyes: Negative.    Cardiovascular: Negative.    Gastrointestinal: Negative.    Genitourinary: Positive for urgency. Negative for dysuria, flank pain, frequency and hematuria.   Musculoskeletal: Negative.    Skin: Negative.    Neurological: Negative.    Endo/Heme/Allergies: Negative.    Psychiatric/Behavioral: The patient is nervous/anxious.        PATIENT HISTORY:    Past Medical History:   Diagnosis Date    Acid reflux      IBS (irritable bowel syndrome)        Past Surgical History:   Procedure Laterality Date     SECTION      3/26/12 at 35 weeks for  labor    FETAL MYELOMENINGOCELE REPAIR HYSTEROTOMY      left arm for birth related brachial plexus injury      TUBAL LIGATION  2014       Family History   Problem Relation Age of Onset    Spina bifida Son      Socioeconomic History    Marital status:    Tobacco Use    Smoking status: Never Smoker    Smokeless tobacco: Never Used   Substance and Sexual Activity    Alcohol use: No    Drug use: No    Sexual activity: Yes     Partners: Male     Birth control/protection: OCP       Allergies:  Sulfa (sulfonamide antibiotics)    Medications:  Outpatient Encounter Medications as of 10/17/2019   Medication Sig Dispense Refill    diphenhydrAMINE (BENADRYL) 25 mg capsule Take 25 mg by mouth every evening.      ibuprofen (ADVIL,MOTRIN) 200 MG tablet Take 200 mg by mouth every 6 (six) hours as needed for Pain.      norethindrone-ethinyl estradiol (JUNEL FE 1/20) 1 mg-20 mcg (21)/75 mg (7) per tablet Take 1 tablet by mouth once daily. 30 tablet 11    phenazopyridine HCl (AZO ORAL) Take by mouth.      diazePAM (VALIUM) 5 MG tablet Take 2 hours before and have a ride (Patient not taking: Reported on 10/17/2019) 1 tablet 0     No facility-administered encounter medications on file as of 10/17/2019.          PHYSICAL EXAMINATION:    The patient generally appears in good health, is appropriately interactive, and is in no apparent distress.    Skin: No lesions.    Mental: Cooperative with normal affect.    Neuro: Grossly intact.    HEENT: Normal. No evidence of lymphadenopathy.    Chest:  normal inspiratory effort.    Abdomen:  Soft, non-tender. No masses or organomegaly. Bladder is not palpable. No evidence of flank discomfort. No evidence of inguinal hernia.    Extremities: No clubbing, cyanosis, or edema      LABS:    Lab Results   Component Value Date     BUN 11 09/10/2019    CREATININE 0.8 09/10/2019     UA 1.000, pH 7, otherwise, negative    IMPRESSION:    Encounter Diagnoses   Name Primary?    Urinary urgency Yes       PLAN:    1. Discussed that the mechanisms are likely multifactoral.  She is feeling stressed about the upcoming move.  Discussed that a dilute urine will make the urgency worse  2.  Bladder Matters book given  3.  If the urgency persists after she moves, three months after the move.

## 2019-10-17 ENCOUNTER — OFFICE VISIT (OUTPATIENT)
Dept: UROLOGY | Facility: CLINIC | Age: 34
End: 2019-10-17
Payer: COMMERCIAL

## 2019-10-17 VITALS
WEIGHT: 153.69 LBS | SYSTOLIC BLOOD PRESSURE: 132 MMHG | BODY MASS INDEX: 28.28 KG/M2 | DIASTOLIC BLOOD PRESSURE: 88 MMHG | HEART RATE: 82 BPM | HEIGHT: 62 IN

## 2019-10-17 DIAGNOSIS — R39.15 URINARY URGENCY: Primary | ICD-10-CM

## 2019-10-17 PROCEDURE — 81002 PR URINALYSIS NONAUTO W/O SCOPE: ICD-10-PCS | Mod: S$GLB,,, | Performed by: UROLOGY

## 2019-10-17 PROCEDURE — 99205 PR OFFICE/OUTPT VISIT, NEW, LEVL V, 60-74 MIN: ICD-10-PCS | Mod: 25,S$GLB,, | Performed by: UROLOGY

## 2019-10-17 PROCEDURE — 81002 URINALYSIS NONAUTO W/O SCOPE: CPT | Mod: S$GLB,,, | Performed by: UROLOGY

## 2019-10-17 PROCEDURE — 99999 PR PBB SHADOW E&M-EST. PATIENT-LVL III: ICD-10-PCS | Mod: PBBFAC,,, | Performed by: UROLOGY

## 2019-10-17 PROCEDURE — 3008F PR BODY MASS INDEX (BMI) DOCUMENTED: ICD-10-PCS | Mod: CPTII,S$GLB,, | Performed by: UROLOGY

## 2019-10-17 PROCEDURE — 99999 PR PBB SHADOW E&M-EST. PATIENT-LVL III: CPT | Mod: PBBFAC,,, | Performed by: UROLOGY

## 2019-10-17 PROCEDURE — 99205 OFFICE O/P NEW HI 60 MIN: CPT | Mod: 25,S$GLB,, | Performed by: UROLOGY

## 2019-10-17 PROCEDURE — 3008F BODY MASS INDEX DOCD: CPT | Mod: CPTII,S$GLB,, | Performed by: UROLOGY

## 2019-12-02 ENCOUNTER — OFFICE VISIT (OUTPATIENT)
Dept: OBSTETRICS AND GYNECOLOGY | Facility: CLINIC | Age: 34
End: 2019-12-02
Payer: COMMERCIAL

## 2019-12-02 VITALS
BODY MASS INDEX: 26.53 KG/M2 | DIASTOLIC BLOOD PRESSURE: 76 MMHG | SYSTOLIC BLOOD PRESSURE: 124 MMHG | WEIGHT: 144.19 LBS | HEIGHT: 62 IN

## 2019-12-02 DIAGNOSIS — L70.9 ACNE, UNSPECIFIED ACNE TYPE: ICD-10-CM

## 2019-12-02 DIAGNOSIS — N92.0 MENORRHAGIA WITH REGULAR CYCLE: ICD-10-CM

## 2019-12-02 DIAGNOSIS — N76.0 ACUTE VAGINITIS: Primary | ICD-10-CM

## 2019-12-02 PROCEDURE — 99999 PR PBB SHADOW E&M-EST. PATIENT-LVL III: CPT | Mod: PBBFAC,,, | Performed by: OBSTETRICS & GYNECOLOGY

## 2019-12-02 PROCEDURE — 99999 PR PBB SHADOW E&M-EST. PATIENT-LVL III: ICD-10-PCS | Mod: PBBFAC,,, | Performed by: OBSTETRICS & GYNECOLOGY

## 2019-12-02 PROCEDURE — 3008F BODY MASS INDEX DOCD: CPT | Mod: CPTII,S$GLB,, | Performed by: OBSTETRICS & GYNECOLOGY

## 2019-12-02 PROCEDURE — 3008F PR BODY MASS INDEX (BMI) DOCUMENTED: ICD-10-PCS | Mod: CPTII,S$GLB,, | Performed by: OBSTETRICS & GYNECOLOGY

## 2019-12-02 PROCEDURE — 87801 DETECT AGNT MULT DNA AMPLI: CPT

## 2019-12-02 PROCEDURE — 87481 CANDIDA DNA AMP PROBE: CPT | Mod: 59

## 2019-12-02 PROCEDURE — 99213 PR OFFICE/OUTPT VISIT, EST, LEVL III, 20-29 MIN: ICD-10-PCS | Mod: S$GLB,,, | Performed by: OBSTETRICS & GYNECOLOGY

## 2019-12-02 PROCEDURE — 99213 OFFICE O/P EST LOW 20 MIN: CPT | Mod: S$GLB,,, | Performed by: OBSTETRICS & GYNECOLOGY

## 2019-12-02 RX ORDER — NORETHINDRONE ACETATE AND ETHINYL ESTRADIOL 1MG-20(21)
1 KIT ORAL DAILY
Qty: 30 TABLET | Refills: 11 | Status: SHIPPED | OUTPATIENT
Start: 2019-12-02 | End: 2020-04-28

## 2019-12-02 NOTE — PROGRESS NOTES
Chief Complaint   Patient presents with    vaginal irritation       History of Present Illness: Katina Cohen is a 34 y.o. female that presents today 2019 for   Chief Complaint   Patient presents with    vaginal irritation     She says worse in the evening.  She reports vaginal irritation worse before her period and best during the period.  She says mild discharge.  Skin irritation with discharge touching it.     Past Medical History:   Diagnosis Date    Acid reflux     IBS (irritable bowel syndrome)        Past Surgical History:   Procedure Laterality Date     SECTION      3/26/12 at 35 weeks for  labor    FETAL MYELOMENINGOCELE REPAIR HYSTEROTOMY      left arm for birth related brachial plexus injury      TUBAL LIGATION  2014       Current Outpatient Medications   Medication Sig Dispense Refill    diphenhydrAMINE (BENADRYL) 25 mg capsule Take 25 mg by mouth every evening.      ibuprofen (ADVIL,MOTRIN) 200 MG tablet Take 200 mg by mouth every 6 (six) hours as needed for Pain.      norethindrone-ethinyl estradiol (JUNEL FE 1/20) 1 mg-20 mcg (21)/75 mg (7) per tablet Take 1 tablet by mouth once daily. 30 tablet 11    diazePAM (VALIUM) 5 MG tablet Take 2 hours before and have a ride (Patient not taking: Reported on 10/17/2019) 1 tablet 0    phenazopyridine HCl (AZO ORAL) Take by mouth.       No current facility-administered medications for this visit.        Review of patient's allergies indicates:   Allergen Reactions    Sulfa (sulfonamide antibiotics)        Family History   Problem Relation Age of Onset    Spina bifida Son     Breast cancer Neg Hx     Ovarian cancer Neg Hx        Social History     Tobacco Use    Smoking status: Never Smoker    Smokeless tobacco: Never Used   Substance Use Topics    Alcohol use: No    Drug use: No       OB History    Para Term  AB Living   2 2 1 1   2   SAB TAB Ectopic Multiple Live Births           2      #  "Outcome Date GA Lbr Jewel/2nd Weight Sex Delivery Anes PTL Lv   2 Term 06/23/15 37w0d  2.807 kg (6 lb 3 oz) M CS-LTranv EPI N JAVAD   1  12 35w0d  2.381 kg (5 lb 4 oz) M CS-LTranv Spinal Y JAVAD      Birth Comments: with second trimester fetal surgery posterior hysterotomy repair        Review of Symptoms:  GENERAL: Denies weight gain or weight loss. Feeling well overall.   SKIN: Denies rash or lesions.   HEAD: Denies head injury or headache.   NODES: Denies enlarged lymph nodes.   CHEST: Denies chest pain or shortness of breath.   CARDIOVASCULAR: Denies palpitations or left sided chest pain.   ABDOMEN: No abdominal pain, constipation, diarrhea, nausea, vomiting or rectal bleeding.   URINARY: No frequency, dysuria, hematuria, or burning on urination.  HEMATOLOGIC: No easy bruisability or excessive bleeding.   MUSCULOSKELETAL: Denies joint pain or swelling.     /76   Ht 5' 2" (1.575 m)   Wt 65.4 kg (144 lb 2.9 oz)   LMP 2019   Physical Exam:  APPEARANCE: Well nourished, well developed, in no acute distress.  SKIN: Normal skin turgor, no lesions.  NECK: Neck symmetric without masses   RESPIRATORY: Normal respiratory effort with no retractions or use of accessory muscles  CARDIOVASCULAR: Peripheral vascular system with no swelling no varicosities and palpation of pulses normal  LYMPHATIC: No enlargements of the lymph nodes noted in the neck, axillae, or groin  ABDOMEN: Soft. No tenderness or masses. No hepatosplenomegaly. No hernias.  PELVIC: Normal external female genitalia without lesions. Normal hair distribution. Adequate perineal body, normal urethral meatus. Urethra with no masses.  Bladder nontender. Vagina moist and well rugated without lesions or discharge. Cervix pink and without lesions. No significant cystocele or rectocele. Bimanual exam showed uterus normal size, shape, position, mobile and nontender. Adnexa without masses or tenderness. Urethra and bladder normal.  EXTREMITIES: No " clubbing cyanosis or edema.    ASSESSMENT/PLAN:  Acute vaginitis  -     Vaginosis Screen by DNA Probe    Acne, unspecified acne type  -     norethindrone-ethinyl estradiol (JUNEL FE 1/20) 1 mg-20 mcg (21)/75 mg (7) per tablet; Take 1 tablet by mouth once daily.  Dispense: 30 tablet; Refill: 11    Menorrhagia with regular cycle  -     norethindrone-ethinyl estradiol (JUNEL FE 1/20) 1 mg-20 mcg (21)/75 mg (7) per tablet; Take 1 tablet by mouth once daily.  Dispense: 30 tablet; Refill: 11      15 minutes spent today with this patient. Greater than half spent in counseling today.

## 2019-12-03 LAB
BACTERIAL VAGINOSIS DNA: NEGATIVE
CANDIDA GLABRATA DNA: NEGATIVE
CANDIDA KRUSEI DNA: NEGATIVE
CANDIDA RRNA VAG QL PROBE: NEGATIVE
T VAGINALIS RRNA GENITAL QL PROBE: NEGATIVE

## 2020-01-13 ENCOUNTER — PATIENT MESSAGE (OUTPATIENT)
Dept: UROLOGY | Facility: CLINIC | Age: 35
End: 2020-01-13

## 2020-01-13 DIAGNOSIS — R39.15 URINARY URGENCY: Primary | ICD-10-CM

## 2020-01-13 DIAGNOSIS — R35.0 INCREASED FREQUENCY OF URINATION: ICD-10-CM

## 2020-02-04 ENCOUNTER — PATIENT MESSAGE (OUTPATIENT)
Dept: UROLOGY | Facility: CLINIC | Age: 35
End: 2020-02-04

## 2020-02-24 ENCOUNTER — PATIENT MESSAGE (OUTPATIENT)
Dept: OBSTETRICS AND GYNECOLOGY | Facility: CLINIC | Age: 35
End: 2020-02-24

## 2020-02-24 ENCOUNTER — PATIENT MESSAGE (OUTPATIENT)
Dept: UROLOGY | Facility: CLINIC | Age: 35
End: 2020-02-24

## 2020-02-24 DIAGNOSIS — R30.0 DYSURIA: Primary | ICD-10-CM

## 2020-02-26 ENCOUNTER — LAB VISIT (OUTPATIENT)
Dept: LAB | Facility: HOSPITAL | Age: 35
End: 2020-02-26
Attending: UROLOGY
Payer: COMMERCIAL

## 2020-02-26 DIAGNOSIS — R30.0 DYSURIA: ICD-10-CM

## 2020-02-26 PROCEDURE — 87086 URINE CULTURE/COLONY COUNT: CPT

## 2020-02-27 LAB — BACTERIA UR CULT: NO GROWTH

## 2020-04-28 ENCOUNTER — PATIENT MESSAGE (OUTPATIENT)
Dept: OBSTETRICS AND GYNECOLOGY | Facility: CLINIC | Age: 35
End: 2020-04-28

## 2020-04-28 ENCOUNTER — OFFICE VISIT (OUTPATIENT)
Dept: OBSTETRICS AND GYNECOLOGY | Facility: CLINIC | Age: 35
End: 2020-04-28
Payer: COMMERCIAL

## 2020-04-28 VITALS — BODY MASS INDEX: 23.29 KG/M2 | WEIGHT: 126.56 LBS | HEIGHT: 62 IN

## 2020-04-28 DIAGNOSIS — Z15.89 METHYLENETETRAHYDROFOLATE REDUCTASE (MTHFR) GENE MUTATION: ICD-10-CM

## 2020-04-28 DIAGNOSIS — N76.0 ACUTE VAGINITIS: Primary | ICD-10-CM

## 2020-04-28 PROCEDURE — 99213 OFFICE O/P EST LOW 20 MIN: CPT | Mod: S$GLB,,, | Performed by: OBSTETRICS & GYNECOLOGY

## 2020-04-28 PROCEDURE — 99999 PR PBB SHADOW E&M-EST. PATIENT-LVL III: ICD-10-PCS | Mod: PBBFAC,,, | Performed by: OBSTETRICS & GYNECOLOGY

## 2020-04-28 PROCEDURE — 87661 TRICHOMONAS VAGINALIS AMPLIF: CPT

## 2020-04-28 PROCEDURE — 99213 PR OFFICE/OUTPT VISIT, EST, LEVL III, 20-29 MIN: ICD-10-PCS | Mod: S$GLB,,, | Performed by: OBSTETRICS & GYNECOLOGY

## 2020-04-28 PROCEDURE — 87481 CANDIDA DNA AMP PROBE: CPT | Mod: 59

## 2020-04-28 PROCEDURE — 99999 PR PBB SHADOW E&M-EST. PATIENT-LVL III: CPT | Mod: PBBFAC,,, | Performed by: OBSTETRICS & GYNECOLOGY

## 2020-04-28 PROCEDURE — 3008F PR BODY MASS INDEX (BMI) DOCUMENTED: ICD-10-PCS | Mod: CPTII,S$GLB,, | Performed by: OBSTETRICS & GYNECOLOGY

## 2020-04-28 PROCEDURE — 87801 DETECT AGNT MULT DNA AMPLI: CPT

## 2020-04-28 PROCEDURE — 3008F BODY MASS INDEX DOCD: CPT | Mod: CPTII,S$GLB,, | Performed by: OBSTETRICS & GYNECOLOGY

## 2020-04-28 RX ORDER — CLOBETASOL PROPIONATE 0.5 MG/G
CREAM TOPICAL 2 TIMES DAILY
Qty: 45 G | Refills: 1 | Status: SHIPPED | OUTPATIENT
Start: 2020-04-28 | End: 2020-05-18 | Stop reason: SDUPTHER

## 2020-04-28 RX ORDER — HYDROXYZINE PAMOATE 25 MG/1
25 CAPSULE ORAL EVERY 12 HOURS
Qty: 60 CAPSULE | Refills: 1 | Status: SHIPPED | OUTPATIENT
Start: 2020-04-28 | End: 2020-08-10

## 2020-04-28 NOTE — PROGRESS NOTES
Chief Complaint   Patient presents with    Vaginal Pain       History of Present Illness: Katina Cohen is a 35 y.o. female that presents today 2020 for   Chief Complaint   Patient presents with    Vaginal Pain   she reports that she has been trying elavil for relief with no results.   She reports that feels depressed and that she can't take care of her kids.  She reports that she has constant burning externally.  She reports that she does not have pain at night.  She reports that it resolves at the beginning of the period then it gets worse at the end of the period.  She reports moderate burning pain consistently       Past Medical History:   Diagnosis Date    Acid reflux     IBS (irritable bowel syndrome)        Past Surgical History:   Procedure Laterality Date     SECTION      3/26/12 at 35 weeks for  labor    FETAL MYELOMENINGOCELE REPAIR HYSTEROTOMY      left arm for birth related brachial plexus injury      TUBAL LIGATION  2014       Current Outpatient Medications   Medication Sig Dispense Refill    amitriptyline (ELAVIL) 25 MG tablet Take 1 tablet by mouth at bedtime 30 tablet 1    diphenhydrAMINE (BENADRYL) 25 mg capsule Take 25 mg by mouth every evening.      ibuprofen (ADVIL,MOTRIN) 200 MG tablet Take 200 mg by mouth every 6 (six) hours as needed for Pain.      clobetasoL (TEMOVATE) 0.05 % cream Apply topically 2 (two) times daily. 45 g 1    hydrOXYzine pamoate (VISTARIL) 25 MG Cap Take 1 capsule (25 mg total) by mouth every 12 (twelve) hours. 60 capsule 1     No current facility-administered medications for this visit.        Review of patient's allergies indicates:   Allergen Reactions    Sulfa (sulfonamide antibiotics)        Family History   Problem Relation Age of Onset    Spina bifida Son     Breast cancer Neg Hx     Ovarian cancer Neg Hx        Social History     Tobacco Use    Smoking status: Never Smoker    Smokeless tobacco: Never Used  "  Substance Use Topics    Alcohol use: No    Drug use: No       OB History    Para Term  AB Living   2 2 1 1   2   SAB TAB Ectopic Multiple Live Births           2      # Outcome Date GA Lbr Jewel/2nd Weight Sex Delivery Anes PTL Lv   2 Term 06/23/15 37w0d  2.807 kg (6 lb 3 oz) M CS-LTranv EPI N JAVAD   1  12 35w0d  2.381 kg (5 lb 4 oz) M CS-LTranv Spinal Y JAVAD      Birth Comments: with second trimester fetal surgery posterior hysterotomy repair        Review of Symptoms:  GENERAL: Denies weight gain or weight loss. Feeling well overall.   SKIN: Denies rash or lesions.   HEAD: Denies head injury or headache.   NODES: Denies enlarged lymph nodes.   CHEST: Denies chest pain or shortness of breath.   CARDIOVASCULAR: Denies palpitations or left sided chest pain.   ABDOMEN: No abdominal pain, constipation, diarrhea, nausea, vomiting or rectal bleeding.   URINARY: No frequency, dysuria, hematuria, or burning on urination.  HEMATOLOGIC: No easy bruisability or excessive bleeding.   MUSCULOSKELETAL: Denies joint pain or swelling.     Ht 5' 2" (1.575 m)   Wt 57.4 kg (126 lb 8.7 oz)   LMP 2020   Physical Exam:  APPEARANCE: Well nourished, well developed, in no acute distress.  SKIN: Normal skin turgor, no lesions.  NECK: Neck symmetric without masses   RESPIRATORY: Normal respiratory effort with no retractions or use of accessory muscles  CARDIOVASCULAR: Peripheral vascular system with no swelling no varicosities and palpation of pulses normal  LYMPHATIC: No enlargements of the lymph nodes noted in the neck, axillae, or groin  ABDOMEN: Soft. No tenderness or masses. No hepatosplenomegaly. No hernias.  PELVIC: Normal external female genitalia without lesions. Normal hair distribution. Adequate perineal body, normal urethral meatus. Urethra with no masses.  Bladder nontender. Vagina moist and well rugated without lesions or discharge. Cervix pink and without lesions. No significant cystocele or " rectocele. Bimanual exam showed uterus normal size, shape, position, mobile and nontender. Adnexa without masses or tenderness. Urethra and bladder normal.  EXTREMITIES: No clubbing cyanosis or edema.    ASSESSMENT/PLAN:  Acute vaginitis  -     hydrOXYzine pamoate (VISTARIL) 25 MG Cap; Take 1 capsule (25 mg total) by mouth every 12 (twelve) hours.  Dispense: 60 capsule; Refill: 1  -     Vaginosis Screen by DNA Probe  -     clobetasoL (TEMOVATE) 0.05 % cream; Apply topically 2 (two) times daily.  Dispense: 45 g; Refill: 1    Methylenetetrahydrofolate reductase (MTHFR) gene mutation      15 minutes spent today with this patient. Greater than half spent in counseling today.

## 2020-05-08 ENCOUNTER — PATIENT MESSAGE (OUTPATIENT)
Dept: OBSTETRICS AND GYNECOLOGY | Facility: CLINIC | Age: 35
End: 2020-05-08

## 2020-05-08 RX ORDER — GABAPENTIN 300 MG/1
300 CAPSULE ORAL 3 TIMES DAILY
Qty: 90 CAPSULE | Refills: 2 | Status: SHIPPED | OUTPATIENT
Start: 2020-05-08 | End: 2020-08-10 | Stop reason: SDUPTHER

## 2020-05-08 RX ORDER — ESTRADIOL 0.1 MG/G
1 CREAM VAGINAL
Qty: 42.5 G | Refills: 1 | Status: SHIPPED | OUTPATIENT
Start: 2020-05-11 | End: 2020-08-10 | Stop reason: SDUPTHER

## 2020-05-18 DIAGNOSIS — N76.0 ACUTE VAGINITIS: ICD-10-CM

## 2020-05-18 RX ORDER — CLOBETASOL PROPIONATE 0.5 MG/G
CREAM TOPICAL 2 TIMES DAILY
Qty: 45 G | Refills: 1 | Status: SHIPPED | OUTPATIENT
Start: 2020-05-18 | End: 2020-08-10

## 2020-06-16 ENCOUNTER — PATIENT MESSAGE (OUTPATIENT)
Dept: OBSTETRICS AND GYNECOLOGY | Facility: CLINIC | Age: 35
End: 2020-06-16

## 2020-06-16 DIAGNOSIS — N76.0 ACUTE VAGINITIS: Primary | ICD-10-CM

## 2020-06-17 ENCOUNTER — PATIENT MESSAGE (OUTPATIENT)
Dept: OBSTETRICS AND GYNECOLOGY | Facility: CLINIC | Age: 35
End: 2020-06-17

## 2020-08-10 ENCOUNTER — OFFICE VISIT (OUTPATIENT)
Dept: OBSTETRICS AND GYNECOLOGY | Facility: CLINIC | Age: 35
End: 2020-08-10
Payer: COMMERCIAL

## 2020-08-10 DIAGNOSIS — Z15.89 METHYLENETETRAHYDROFOLATE REDUCTASE (MTHFR) GENE MUTATION: ICD-10-CM

## 2020-08-10 DIAGNOSIS — Z30.9 ENCOUNTER FOR CONTRACEPTIVE MANAGEMENT, UNSPECIFIED TYPE: Primary | ICD-10-CM

## 2020-08-10 DIAGNOSIS — B37.9 CANDIDIASIS: ICD-10-CM

## 2020-08-10 DIAGNOSIS — N76.2 ACUTE VULVITIS: ICD-10-CM

## 2020-08-10 PROCEDURE — 99213 OFFICE O/P EST LOW 20 MIN: CPT | Mod: 95,,, | Performed by: OBSTETRICS & GYNECOLOGY

## 2020-08-10 PROCEDURE — 99213 PR OFFICE/OUTPT VISIT, EST, LEVL III, 20-29 MIN: ICD-10-PCS | Mod: 95,,, | Performed by: OBSTETRICS & GYNECOLOGY

## 2020-08-10 RX ORDER — LEVONORGESTREL / ETHINYL ESTRADIOL AND ETHINYL ESTRADIOL 150-30(84)
1 KIT ORAL DAILY
Qty: 91 EACH | Refills: 3 | Status: SHIPPED | OUTPATIENT
Start: 2020-08-10 | End: 2021-07-10 | Stop reason: SDUPTHER

## 2020-08-10 RX ORDER — ESTRADIOL 0.1 MG/G
1 CREAM VAGINAL
Qty: 42.5 G | Refills: 1 | Status: SHIPPED | OUTPATIENT
Start: 2020-08-10 | End: 2023-04-25

## 2020-08-10 RX ORDER — GABAPENTIN 300 MG/1
300 CAPSULE ORAL 3 TIMES DAILY
Qty: 90 CAPSULE | Refills: 2 | Status: SHIPPED | OUTPATIENT
Start: 2020-08-10 | End: 2021-01-06 | Stop reason: SDUPTHER

## 2020-08-10 RX ORDER — FLUCONAZOLE 100 MG/1
100 TABLET ORAL DAILY
Qty: 3 TABLET | Refills: 0 | Status: SHIPPED | OUTPATIENT
Start: 2020-08-10 | End: 2020-08-13

## 2020-08-10 NOTE — PROGRESS NOTES
The patient location is: home  The chief complaint leading to consultation is:   Chief Complaint   Patient presents with    Contraception     Visit type: Virtual visit with synchronous audio and video  Total time spent with patient: 15  Each patient to whom he or she provides medical services by telemedicine is:  (1) informed of the relationship between the physician and patient and the respective role of any other health care provider with respect to management of the patient; and (2) notified that he or she may decline to receive medical services by telemedicine and may withdraw from such care at any time.    15 minutes spent today with this patient. Greater than half spent in counseling today.     Chief Complaint   Patient presents with    Contraception       History of Present Illness: Katina Cohen is a 35 y.o. female that presents today 8/10/2020 for   Chief Complaint   Patient presents with    Contraception         Past Medical History:   Diagnosis Date    Acid reflux     IBS (irritable bowel syndrome)        Past Surgical History:   Procedure Laterality Date     SECTION      3/26/12 at 35 weeks for  labor    FETAL MYELOMENINGOCELE REPAIR HYSTEROTOMY      left arm for birth related brachial plexus injury      TUBAL LIGATION  2014       Current Outpatient Medications   Medication Sig Dispense Refill    amitriptyline (ELAVIL) 25 MG tablet Take 1 tablet by mouth at bedtime 30 tablet 1    diphenhydrAMINE (BENADRYL) 25 mg capsule Take 25 mg by mouth every evening.      estradioL (ESTRACE) 0.01 % (0.1 mg/gram) vaginal cream Place 1 gram vaginally every Monday and Thursday. 42.5 g 1    fluconazole (DIFLUCAN) 100 MG tablet Take 1 tablet (100 mg total) by mouth once daily for 3 days 3 tablet 0    gabapentin (NEURONTIN) 300 MG capsule Take 1 capsule (300 mg total) by mouth 3 (three) times daily. 90 capsule 2    L norgest/e.estradioL-e.estrad (SEASONIQUE) 0.15 mg-30 mcg  (84)/10 mcg (7) 3MPk Take 1 tablet by mouth once daily. 91 each 3     No current facility-administered medications for this visit.        Review of patient's allergies indicates:   Allergen Reactions    Sulfa (sulfonamide antibiotics)        Family History   Problem Relation Age of Onset    Spina bifida Son     Breast cancer Neg Hx     Ovarian cancer Neg Hx        Social History     Tobacco Use    Smoking status: Never Smoker    Smokeless tobacco: Never Used   Substance Use Topics    Alcohol use: No    Drug use: No       OB History    Para Term  AB Living   2 2 1 1   2   SAB TAB Ectopic Multiple Live Births           2      # Outcome Date GA Lbr Jewel/2nd Weight Sex Delivery Anes PTL Lv   2 Term 06/23/15 37w0d  2.807 kg (6 lb 3 oz) M CS-LTranv EPI N JAVAD   1  12 35w0d  2.381 kg (5 lb 4 oz) M CS-LTranv Spinal Y JAVAD      Birth Comments: with second trimester fetal surgery posterior hysterotomy repair        Review of Symptoms:  GENERAL: Denies weight gain or weight loss. Feeling well overall.   SKIN: Denies rash or lesions.   HEAD: Denies head injury or headache.   NODES: Denies enlarged lymph nodes.   CHEST: Denies chest pain or shortness of breath.   CARDIOVASCULAR: Denies palpitations or left sided chest pain.   ABDOMEN: No abdominal pain, constipation, diarrhea, nausea, vomiting or rectal bleeding.   URINARY: No frequency, dysuria, hematuria, or burning on urination.  HEMATOLOGIC: No easy bruisability or excessive bleeding.   MUSCULOSKELETAL: Denies joint pain or swelling.     There were no vitals taken for this visit.    Physical Exam   Constitutional: No distress.   Skin: She is not diaphoretic.   Psychiatric: She has a normal mood and affect. Her behavior is normal. Judgment and thought content normal.         ASSESSMENT/PLAN:  Encounter for contraceptive management, unspecified type  -     L norgest/e.estradioL-e.estrad (SEASONIQUE) 0.15 mg-30 mcg (84)/10 mcg (7) 3MPk; Take 1  tablet by mouth once daily.  Dispense: 91 each; Refill: 3    Acute vulvitis  -     estradioL (ESTRACE) 0.01 % (0.1 mg/gram) vaginal cream; Place 1 gram vaginally every Monday and Thursday.  Dispense: 42.5 g; Refill: 1  -     gabapentin (NEURONTIN) 300 MG capsule; Take 1 capsule (300 mg total) by mouth 3 (three) times daily.  Dispense: 90 capsule; Refill: 2    Methylenetetrahydrofolate reductase (MTHFR) gene mutation    Candidiasis  -     fluconazole (DIFLUCAN) 100 MG tablet; Take 1 tablet (100 mg total) by mouth once daily for 3 days  Dispense: 3 tablet; Refill: 0        Options discussed at length.     COVID-19 Discussion:     I had long discussion with patient and any applicable family about the COVID-19 coronavirus epidemic and the recommended precautions. I have re-iterated the CDC recommendations for adequate hand washing, use of hand -like products, and coughing into elbow, etc. In addition, especially for immunocompromised patients, I have recommended avoidance of crowds, including movie theaters, restaurants, churches, etc. I have recommended avoidance of any sick or symptomatic family members and/or friends. The patient has been asked to call us immediately with any symptom developments, issues, questions or other general concerns.

## 2020-08-31 ENCOUNTER — PATIENT MESSAGE (OUTPATIENT)
Dept: OBSTETRICS AND GYNECOLOGY | Facility: CLINIC | Age: 35
End: 2020-08-31

## 2020-08-31 DIAGNOSIS — F32.A DEPRESSION, UNSPECIFIED DEPRESSION TYPE: Primary | ICD-10-CM

## 2020-09-10 ENCOUNTER — OFFICE VISIT (OUTPATIENT)
Dept: FAMILY MEDICINE | Facility: CLINIC | Age: 35
End: 2020-09-10
Payer: COMMERCIAL

## 2020-09-10 VITALS
HEIGHT: 63 IN | DIASTOLIC BLOOD PRESSURE: 74 MMHG | HEART RATE: 99 BPM | BODY MASS INDEX: 22.69 KG/M2 | SYSTOLIC BLOOD PRESSURE: 128 MMHG | OXYGEN SATURATION: 98 % | TEMPERATURE: 98 F | WEIGHT: 128.06 LBS

## 2020-09-10 DIAGNOSIS — R00.2 PALPITATION: ICD-10-CM

## 2020-09-10 DIAGNOSIS — L60.0 INGROWN TOENAIL: Primary | ICD-10-CM

## 2020-09-10 DIAGNOSIS — F41.1 GAD (GENERALIZED ANXIETY DISORDER): ICD-10-CM

## 2020-09-10 PROCEDURE — 99999 PR PBB SHADOW E&M-EST. PATIENT-LVL IV: CPT | Mod: PBBFAC,,, | Performed by: INTERNAL MEDICINE

## 2020-09-10 PROCEDURE — 99203 PR OFFICE/OUTPT VISIT, NEW, LEVL III, 30-44 MIN: ICD-10-PCS | Mod: S$GLB,,, | Performed by: INTERNAL MEDICINE

## 2020-09-10 PROCEDURE — 99999 PR PBB SHADOW E&M-EST. PATIENT-LVL IV: ICD-10-PCS | Mod: PBBFAC,,, | Performed by: INTERNAL MEDICINE

## 2020-09-10 PROCEDURE — 99203 OFFICE O/P NEW LOW 30 MIN: CPT | Mod: S$GLB,,, | Performed by: INTERNAL MEDICINE

## 2020-09-10 PROCEDURE — 3008F PR BODY MASS INDEX (BMI) DOCUMENTED: ICD-10-PCS | Mod: CPTII,S$GLB,, | Performed by: INTERNAL MEDICINE

## 2020-09-10 PROCEDURE — 3008F BODY MASS INDEX DOCD: CPT | Mod: CPTII,S$GLB,, | Performed by: INTERNAL MEDICINE

## 2020-09-10 RX ORDER — SERTRALINE HYDROCHLORIDE 50 MG/1
50 TABLET, FILM COATED ORAL DAILY
Qty: 30 TABLET | Refills: 11 | Status: SHIPPED | OUTPATIENT
Start: 2020-09-10 | End: 2020-09-15

## 2020-09-10 RX ORDER — PROPRANOLOL HYDROCHLORIDE 10 MG/1
10 TABLET ORAL 3 TIMES DAILY
Qty: 90 TABLET | Refills: 11 | Status: SHIPPED | OUTPATIENT
Start: 2020-09-10 | End: 2021-06-10 | Stop reason: ALTCHOICE

## 2020-09-10 NOTE — PROGRESS NOTES
Subjective:       Patient ID: Katina Cohen is a 35 y.o. female.    Chief Complaint: Establish Care    Pt her eto get established. She has been dealing with some urinary and vaginal pain issues for which she has been urology and gyn and nothing has been found., she says when she is having anxietythe pain is worse. She has struggled with anxiety and panic attacks for years. She has heart palpitations. She has insomnia.     Review of Systems   Constitutional: Negative for fatigue, fever and unexpected weight change.   HENT: Negative for nasal congestion, postnasal drip and sore throat.    Eyes: Negative for visual disturbance.   Respiratory: Negative for cough, chest tightness, shortness of breath and wheezing.    Cardiovascular: Negative for chest pain, palpitations and leg swelling.   Gastrointestinal: Negative for abdominal pain, blood in stool, constipation, diarrhea, nausea and vomiting.   Endocrine: Negative for cold intolerance and heat intolerance.   Genitourinary: Negative for difficulty urinating, dysuria and frequency.   Musculoskeletal: Negative for back pain, joint swelling and myalgias.   Integumentary:  Negative for rash.   Allergic/Immunologic: Negative for environmental allergies.   Neurological: Negative for dizziness, seizures, numbness and headaches.   Hematological: Does not bruise/bleed easily.   Psychiatric/Behavioral: Negative for agitation and sleep disturbance.         Objective:      Physical Exam  Constitutional:       Appearance: She is well-developed.   HENT:      Head: Normocephalic and atraumatic.   Eyes:      Conjunctiva/sclera: Conjunctivae normal.      Pupils: Pupils are equal, round, and reactive to light.   Neck:      Musculoskeletal: Normal range of motion and neck supple.      Thyroid: No thyromegaly.   Cardiovascular:      Rate and Rhythm: Normal rate and regular rhythm.      Heart sounds: Normal heart sounds. No murmur. No friction rub. No gallop.    Pulmonary:       Effort: Pulmonary effort is normal. No respiratory distress.      Breath sounds: Normal breath sounds. No wheezing or rales.   Abdominal:      General: Bowel sounds are normal. There is no distension.      Palpations: Abdomen is soft.      Tenderness: There is no abdominal tenderness.   Musculoskeletal: Normal range of motion.   Lymphadenopathy:      Cervical: No cervical adenopathy.   Skin:     General: Skin is warm and dry.   Neurological:      Mental Status: She is alert and oriented to person, place, and time.      Cranial Nerves: No cranial nerve deficit.   Psychiatric:         Behavior: Behavior normal.         Assessment:       1. Ingrown toenail    2. BETTINA (generalized anxiety disorder)    3. Palpitation        Plan:       Bettina- start zoloft 50 mg (25 for first few days). F/u 4 weeks  Palpitations- liekly anxiety related. Heart exam normal. Gave propranolo to take prn

## 2020-09-14 ENCOUNTER — PATIENT MESSAGE (OUTPATIENT)
Dept: FAMILY MEDICINE | Facility: CLINIC | Age: 35
End: 2020-09-14

## 2020-09-15 RX ORDER — FLUOXETINE 20 MG/1
20 TABLET ORAL DAILY
Qty: 30 TABLET | Refills: 11 | Status: SHIPPED | OUTPATIENT
Start: 2020-09-15 | End: 2020-10-13

## 2020-10-05 ENCOUNTER — PATIENT MESSAGE (OUTPATIENT)
Dept: FAMILY MEDICINE | Facility: CLINIC | Age: 35
End: 2020-10-05

## 2020-10-13 ENCOUNTER — OFFICE VISIT (OUTPATIENT)
Dept: FAMILY MEDICINE | Facility: CLINIC | Age: 35
End: 2020-10-13
Payer: COMMERCIAL

## 2020-10-13 VITALS
WEIGHT: 137.81 LBS | HEART RATE: 83 BPM | HEIGHT: 63 IN | DIASTOLIC BLOOD PRESSURE: 80 MMHG | TEMPERATURE: 98 F | SYSTOLIC BLOOD PRESSURE: 118 MMHG | BODY MASS INDEX: 24.42 KG/M2 | OXYGEN SATURATION: 98 %

## 2020-10-13 DIAGNOSIS — R10.2 PELVIC PAIN: Primary | ICD-10-CM

## 2020-10-13 PROCEDURE — 99999 PR PBB SHADOW E&M-EST. PATIENT-LVL III: ICD-10-PCS | Mod: PBBFAC,,, | Performed by: INTERNAL MEDICINE

## 2020-10-13 PROCEDURE — 3008F BODY MASS INDEX DOCD: CPT | Mod: CPTII,S$GLB,, | Performed by: INTERNAL MEDICINE

## 2020-10-13 PROCEDURE — 99213 OFFICE O/P EST LOW 20 MIN: CPT | Mod: S$GLB,,, | Performed by: INTERNAL MEDICINE

## 2020-10-13 PROCEDURE — 99999 PR PBB SHADOW E&M-EST. PATIENT-LVL III: CPT | Mod: PBBFAC,,, | Performed by: INTERNAL MEDICINE

## 2020-10-13 PROCEDURE — 3008F PR BODY MASS INDEX (BMI) DOCUMENTED: ICD-10-PCS | Mod: CPTII,S$GLB,, | Performed by: INTERNAL MEDICINE

## 2020-10-13 PROCEDURE — 99213 PR OFFICE/OUTPT VISIT, EST, LEVL III, 20-29 MIN: ICD-10-PCS | Mod: S$GLB,,, | Performed by: INTERNAL MEDICINE

## 2020-10-13 RX ORDER — DULOXETIN HYDROCHLORIDE 30 MG/1
30 CAPSULE, DELAYED RELEASE ORAL DAILY
Qty: 30 CAPSULE | Refills: 11 | Status: SHIPPED | OUTPATIENT
Start: 2020-10-13 | End: 2020-12-03

## 2020-10-13 NOTE — PROGRESS NOTES
Subjective:       Patient ID: Katina Cohen is a 35 y.o. female.    Chief Complaint: Follow-up    Pt here for follow up    Continued vaginal, urinary pain, vulvar pain. She describes pain as burning vulvar pain.  She has seen ob and urology without any relief. She has tried neurontin without help.     Anxiety- had side effects from zoloft (nausea) not interested in trying another ssri       Review of Systems   Constitutional: Negative for fatigue, fever and unexpected weight change.   HENT: Negative for nasal congestion, postnasal drip and sore throat.    Eyes: Negative for visual disturbance.   Respiratory: Negative for cough, chest tightness, shortness of breath and wheezing.    Cardiovascular: Negative for chest pain, palpitations and leg swelling.   Gastrointestinal: Negative for abdominal pain, blood in stool, constipation, diarrhea, nausea and vomiting.   Endocrine: Negative for cold intolerance and heat intolerance.   Genitourinary: Negative for difficulty urinating, dysuria and frequency.   Musculoskeletal: Negative for back pain, joint swelling and myalgias.   Integumentary:  Negative for rash.   Allergic/Immunologic: Negative for environmental allergies.   Neurological: Negative for dizziness, seizures, numbness and headaches.   Hematological: Does not bruise/bleed easily.   Psychiatric/Behavioral: Negative for agitation and sleep disturbance.         Objective:      Physical Exam  Constitutional:       Appearance: She is well-developed.   HENT:      Head: Normocephalic and atraumatic.   Eyes:      Conjunctiva/sclera: Conjunctivae normal.      Pupils: Pupils are equal, round, and reactive to light.   Neck:      Musculoskeletal: Normal range of motion and neck supple.      Thyroid: No thyromegaly.   Cardiovascular:      Rate and Rhythm: Normal rate and regular rhythm.      Heart sounds: Normal heart sounds. No murmur. No friction rub. No gallop.    Pulmonary:      Effort: Pulmonary effort is  normal. No respiratory distress.      Breath sounds: Normal breath sounds. No wheezing or rales.   Abdominal:      General: Bowel sounds are normal. There is no distension.      Palpations: Abdomen is soft.      Tenderness: There is no abdominal tenderness.   Musculoskeletal: Normal range of motion.   Lymphadenopathy:      Cervical: No cervical adenopathy.   Skin:     General: Skin is warm and dry.   Neurological:      Mental Status: She is alert and oriented to person, place, and time.      Cranial Nerves: No cranial nerve deficit.   Psychiatric:         Behavior: Behavior normal.         Assessment:       1. Pelvic pain        Plan:       Continued pelvic/vaginal/urinary pain- uncelar etiology. Pt to see gyn for second opinion. Also referred to pelvic floor PT. Pt interested in this.  Trying cymbalta as well

## 2020-10-20 ENCOUNTER — PATIENT MESSAGE (OUTPATIENT)
Dept: FAMILY MEDICINE | Facility: CLINIC | Age: 35
End: 2020-10-20

## 2020-10-20 RX ORDER — ALPRAZOLAM 0.25 MG/1
0.25 TABLET ORAL 2 TIMES DAILY PRN
Qty: 30 TABLET | Refills: 1 | Status: SHIPPED | OUTPATIENT
Start: 2020-10-20 | End: 2020-12-03 | Stop reason: SDUPTHER

## 2020-11-14 ENCOUNTER — PATIENT MESSAGE (OUTPATIENT)
Dept: FAMILY MEDICINE | Facility: CLINIC | Age: 35
End: 2020-11-14

## 2020-12-03 ENCOUNTER — OFFICE VISIT (OUTPATIENT)
Dept: FAMILY MEDICINE | Facility: CLINIC | Age: 35
End: 2020-12-03
Payer: COMMERCIAL

## 2020-12-03 VITALS
OXYGEN SATURATION: 99 % | HEIGHT: 63 IN | WEIGHT: 138 LBS | SYSTOLIC BLOOD PRESSURE: 98 MMHG | HEART RATE: 84 BPM | DIASTOLIC BLOOD PRESSURE: 70 MMHG | BODY MASS INDEX: 24.45 KG/M2

## 2020-12-03 DIAGNOSIS — R10.2 PELVIC PAIN: Primary | ICD-10-CM

## 2020-12-03 PROCEDURE — 1126F PR PAIN SEVERITY QUANTIFIED, NO PAIN PRESENT: ICD-10-PCS | Mod: S$GLB,,, | Performed by: INTERNAL MEDICINE

## 2020-12-03 PROCEDURE — 99999 PR PBB SHADOW E&M-EST. PATIENT-LVL III: ICD-10-PCS | Mod: PBBFAC,,, | Performed by: INTERNAL MEDICINE

## 2020-12-03 PROCEDURE — 1126F AMNT PAIN NOTED NONE PRSNT: CPT | Mod: S$GLB,,, | Performed by: INTERNAL MEDICINE

## 2020-12-03 PROCEDURE — 99999 PR PBB SHADOW E&M-EST. PATIENT-LVL III: CPT | Mod: PBBFAC,,, | Performed by: INTERNAL MEDICINE

## 2020-12-03 PROCEDURE — 99213 OFFICE O/P EST LOW 20 MIN: CPT | Mod: S$GLB,,, | Performed by: INTERNAL MEDICINE

## 2020-12-03 PROCEDURE — 3008F PR BODY MASS INDEX (BMI) DOCUMENTED: ICD-10-PCS | Mod: CPTII,S$GLB,, | Performed by: INTERNAL MEDICINE

## 2020-12-03 PROCEDURE — 3008F BODY MASS INDEX DOCD: CPT | Mod: CPTII,S$GLB,, | Performed by: INTERNAL MEDICINE

## 2020-12-03 PROCEDURE — 99213 PR OFFICE/OUTPT VISIT, EST, LEVL III, 20-29 MIN: ICD-10-PCS | Mod: S$GLB,,, | Performed by: INTERNAL MEDICINE

## 2020-12-03 RX ORDER — ALPRAZOLAM 0.25 MG/1
0.25 TABLET ORAL 2 TIMES DAILY PRN
Qty: 30 TABLET | Refills: 1 | Status: SHIPPED | OUTPATIENT
Start: 2020-12-03 | End: 2021-02-08 | Stop reason: SDUPTHER

## 2020-12-30 ENCOUNTER — PATIENT MESSAGE (OUTPATIENT)
Dept: FAMILY MEDICINE | Facility: CLINIC | Age: 35
End: 2020-12-30

## 2020-12-30 DIAGNOSIS — R10.2 PELVIC PAIN: Primary | ICD-10-CM

## 2020-12-31 ENCOUNTER — PATIENT MESSAGE (OUTPATIENT)
Dept: FAMILY MEDICINE | Facility: CLINIC | Age: 35
End: 2020-12-31

## 2021-01-06 DIAGNOSIS — N76.2 ACUTE VULVITIS: ICD-10-CM

## 2021-01-06 RX ORDER — GABAPENTIN 300 MG/1
300 CAPSULE ORAL 3 TIMES DAILY
Qty: 90 CAPSULE | Refills: 2 | Status: SHIPPED | OUTPATIENT
Start: 2021-01-06 | End: 2021-05-04 | Stop reason: SDUPTHER

## 2021-01-13 ENCOUNTER — PATIENT MESSAGE (OUTPATIENT)
Dept: FAMILY MEDICINE | Facility: CLINIC | Age: 36
End: 2021-01-13

## 2021-01-13 DIAGNOSIS — Z00.00 ROUTINE CHECK-UP: Primary | ICD-10-CM

## 2021-01-23 ENCOUNTER — LAB VISIT (OUTPATIENT)
Dept: LAB | Facility: HOSPITAL | Age: 36
End: 2021-01-23
Attending: INTERNAL MEDICINE
Payer: COMMERCIAL

## 2021-01-23 DIAGNOSIS — Z00.00 ROUTINE CHECK-UP: ICD-10-CM

## 2021-01-23 LAB — TSH SERPL DL<=0.005 MIU/L-ACNC: 0.91 UIU/ML (ref 0.4–4)

## 2021-01-23 PROCEDURE — 84443 ASSAY THYROID STIM HORMONE: CPT

## 2021-01-23 PROCEDURE — 36415 COLL VENOUS BLD VENIPUNCTURE: CPT | Mod: PO

## 2021-01-25 ENCOUNTER — PATIENT MESSAGE (OUTPATIENT)
Dept: FAMILY MEDICINE | Facility: CLINIC | Age: 36
End: 2021-01-25

## 2021-01-29 ENCOUNTER — PATIENT MESSAGE (OUTPATIENT)
Dept: FAMILY MEDICINE | Facility: CLINIC | Age: 36
End: 2021-01-29

## 2021-02-01 ENCOUNTER — PATIENT MESSAGE (OUTPATIENT)
Dept: FAMILY MEDICINE | Facility: CLINIC | Age: 36
End: 2021-02-01

## 2021-02-08 RX ORDER — ALPRAZOLAM 0.25 MG/1
0.25 TABLET ORAL 2 TIMES DAILY PRN
Qty: 30 TABLET | Refills: 1 | Status: SHIPPED | OUTPATIENT
Start: 2021-02-08 | End: 2021-03-25 | Stop reason: SDUPTHER

## 2021-03-02 ENCOUNTER — PATIENT MESSAGE (OUTPATIENT)
Dept: FAMILY MEDICINE | Facility: CLINIC | Age: 36
End: 2021-03-02

## 2021-03-13 ENCOUNTER — LAB VISIT (OUTPATIENT)
Dept: LAB | Facility: HOSPITAL | Age: 36
End: 2021-03-13
Attending: INTERNAL MEDICINE
Payer: COMMERCIAL

## 2021-03-13 DIAGNOSIS — R10.2 PELVIC PAIN: ICD-10-CM

## 2021-03-13 LAB
DHEA-S SERPL-MCNC: 32.8 UG/DL (ref 95.8–511.7)
ESTRADIOL SERPL-MCNC: <10 PG/ML
FSH SERPL-ACNC: 1.4 MIU/ML
PROGEST SERPL-MCNC: <0.1 NG/ML
PROLACTIN SERPL IA-MCNC: 8.4 NG/ML (ref 5.2–26.5)
TESTOST SERPL-MCNC: 12 NG/DL (ref 5–73)

## 2021-03-13 PROCEDURE — 84144 ASSAY OF PROGESTERONE: CPT | Performed by: INTERNAL MEDICINE

## 2021-03-13 PROCEDURE — 83001 ASSAY OF GONADOTROPIN (FSH): CPT | Performed by: INTERNAL MEDICINE

## 2021-03-13 PROCEDURE — 36415 COLL VENOUS BLD VENIPUNCTURE: CPT | Mod: PO | Performed by: INTERNAL MEDICINE

## 2021-03-13 PROCEDURE — 82627 DEHYDROEPIANDROSTERONE: CPT | Performed by: INTERNAL MEDICINE

## 2021-03-13 PROCEDURE — 84403 ASSAY OF TOTAL TESTOSTERONE: CPT | Performed by: INTERNAL MEDICINE

## 2021-03-13 PROCEDURE — 84146 ASSAY OF PROLACTIN: CPT | Performed by: INTERNAL MEDICINE

## 2021-03-13 PROCEDURE — 82670 ASSAY OF TOTAL ESTRADIOL: CPT | Performed by: INTERNAL MEDICINE

## 2021-03-15 ENCOUNTER — PATIENT MESSAGE (OUTPATIENT)
Dept: FAMILY MEDICINE | Facility: CLINIC | Age: 36
End: 2021-03-15

## 2021-03-28 RX ORDER — ALPRAZOLAM 0.25 MG/1
0.25 TABLET ORAL 2 TIMES DAILY PRN
Qty: 30 TABLET | Refills: 1 | Status: SHIPPED | OUTPATIENT
Start: 2021-03-28 | End: 2021-06-10 | Stop reason: SDUPTHER

## 2021-04-13 ENCOUNTER — PATIENT MESSAGE (OUTPATIENT)
Dept: OBSTETRICS AND GYNECOLOGY | Facility: CLINIC | Age: 36
End: 2021-04-13

## 2021-05-04 DIAGNOSIS — N76.2 ACUTE VULVITIS: ICD-10-CM

## 2021-05-04 RX ORDER — GABAPENTIN 300 MG/1
300 CAPSULE ORAL 3 TIMES DAILY
Qty: 90 CAPSULE | Refills: 2 | Status: SHIPPED | OUTPATIENT
Start: 2021-05-04 | End: 2021-07-26 | Stop reason: SDUPTHER

## 2021-05-11 ENCOUNTER — PATIENT OUTREACH (OUTPATIENT)
Dept: ADMINISTRATIVE | Facility: OTHER | Age: 36
End: 2021-05-11

## 2021-05-12 PROBLEM — B35.3 TINEA PEDIS OF LEFT FOOT: Status: ACTIVE | Noted: 2021-05-12

## 2021-05-12 PROBLEM — M79.676 PAIN AROUND TOENAIL: Status: ACTIVE | Noted: 2021-05-12

## 2021-05-12 PROBLEM — B35.1 ONYCHOMYCOSIS DUE TO DERMATOPHYTE: Status: ACTIVE | Noted: 2021-05-12

## 2021-06-10 DIAGNOSIS — Z30.9 ENCOUNTER FOR CONTRACEPTIVE MANAGEMENT, UNSPECIFIED TYPE: ICD-10-CM

## 2021-06-10 RX ORDER — ALPRAZOLAM 0.25 MG/1
0.25 TABLET ORAL 2 TIMES DAILY PRN
Qty: 30 TABLET | Refills: 1 | Status: SHIPPED | OUTPATIENT
Start: 2021-06-10 | End: 2021-08-16 | Stop reason: SDUPTHER

## 2021-06-10 RX ORDER — LEVONORGESTREL / ETHINYL ESTRADIOL AND ETHINYL ESTRADIOL 150-30(84)
1 KIT ORAL DAILY
Qty: 91 EACH | Refills: 3 | Status: CANCELLED | OUTPATIENT
Start: 2021-06-10 | End: 2022-06-10

## 2021-07-10 DIAGNOSIS — Z30.9 ENCOUNTER FOR CONTRACEPTIVE MANAGEMENT, UNSPECIFIED TYPE: ICD-10-CM

## 2021-07-12 RX ORDER — LEVONORGESTREL / ETHINYL ESTRADIOL AND ETHINYL ESTRADIOL 150-30(84)
1 KIT ORAL DAILY
Qty: 91 EACH | Refills: 3 | Status: SHIPPED | OUTPATIENT
Start: 2021-07-12 | End: 2022-06-25 | Stop reason: SDUPTHER

## 2021-07-26 DIAGNOSIS — N76.2 ACUTE VULVITIS: ICD-10-CM

## 2021-07-26 RX ORDER — GABAPENTIN 300 MG/1
300 CAPSULE ORAL 3 TIMES DAILY
Qty: 90 CAPSULE | Refills: 2 | Status: SHIPPED | OUTPATIENT
Start: 2021-07-26 | End: 2021-10-16 | Stop reason: SDUPTHER

## 2021-08-17 RX ORDER — ALPRAZOLAM 0.25 MG/1
0.25 TABLET ORAL 2 TIMES DAILY PRN
Qty: 30 TABLET | Refills: 1 | Status: SHIPPED | OUTPATIENT
Start: 2021-08-17 | End: 2022-09-27 | Stop reason: SDUPTHER

## 2021-10-16 DIAGNOSIS — N76.2 ACUTE VULVITIS: ICD-10-CM

## 2021-10-18 RX ORDER — GABAPENTIN 300 MG/1
300 CAPSULE ORAL 3 TIMES DAILY
Qty: 90 CAPSULE | Refills: 2 | Status: SHIPPED | OUTPATIENT
Start: 2021-10-18 | End: 2022-01-07 | Stop reason: SDUPTHER

## 2022-01-07 DIAGNOSIS — N76.2 ACUTE VULVITIS: ICD-10-CM

## 2022-01-07 RX ORDER — GABAPENTIN 300 MG/1
300 CAPSULE ORAL 3 TIMES DAILY
Qty: 90 CAPSULE | Refills: 2 | Status: SHIPPED | OUTPATIENT
Start: 2022-01-07 | End: 2022-05-31 | Stop reason: SDUPTHER

## 2022-03-31 DIAGNOSIS — N76.2 ACUTE VULVITIS: ICD-10-CM

## 2022-03-31 RX ORDER — GABAPENTIN 300 MG/1
300 CAPSULE ORAL 3 TIMES DAILY
Qty: 90 CAPSULE | Refills: 2 | OUTPATIENT
Start: 2022-03-31 | End: 2023-03-31

## 2022-05-21 DIAGNOSIS — N76.2 ACUTE VULVITIS: ICD-10-CM

## 2022-05-23 RX ORDER — GABAPENTIN 300 MG/1
300 CAPSULE ORAL 3 TIMES DAILY
Qty: 90 CAPSULE | Refills: 2 | OUTPATIENT
Start: 2022-05-23 | End: 2023-05-23

## 2022-05-26 DIAGNOSIS — N76.2 ACUTE VULVITIS: ICD-10-CM

## 2022-05-26 RX ORDER — GABAPENTIN 300 MG/1
300 CAPSULE ORAL 3 TIMES DAILY
Qty: 90 CAPSULE | Refills: 2 | Status: CANCELLED | OUTPATIENT
Start: 2022-05-26 | End: 2023-05-26

## 2022-05-31 ENCOUNTER — PATIENT MESSAGE (OUTPATIENT)
Dept: ADMINISTRATIVE | Facility: HOSPITAL | Age: 37
End: 2022-05-31
Payer: COMMERCIAL

## 2022-05-31 DIAGNOSIS — N76.2 ACUTE VULVITIS: ICD-10-CM

## 2022-05-31 RX ORDER — GABAPENTIN 300 MG/1
300 CAPSULE ORAL 3 TIMES DAILY
Qty: 90 CAPSULE | Refills: 2 | Status: SHIPPED | OUTPATIENT
Start: 2022-05-31 | End: 2022-08-19 | Stop reason: SDUPTHER

## 2022-06-25 DIAGNOSIS — Z30.9 ENCOUNTER FOR CONTRACEPTIVE MANAGEMENT, UNSPECIFIED TYPE: ICD-10-CM

## 2022-06-27 RX ORDER — LEVONORGESTREL / ETHINYL ESTRADIOL AND ETHINYL ESTRADIOL 150-30(84)
1 KIT ORAL DAILY
Qty: 91 EACH | Refills: 3 | Status: SHIPPED | OUTPATIENT
Start: 2022-06-27 | End: 2023-04-25

## 2022-08-19 DIAGNOSIS — N76.2 ACUTE VULVITIS: ICD-10-CM

## 2022-08-19 RX ORDER — GABAPENTIN 300 MG/1
300 CAPSULE ORAL 3 TIMES DAILY
Qty: 90 CAPSULE | Refills: 2 | Status: SHIPPED | OUTPATIENT
Start: 2022-08-19 | End: 2023-02-05 | Stop reason: SDUPTHER

## 2022-08-19 RX ORDER — GABAPENTIN 300 MG/1
300 CAPSULE ORAL 3 TIMES DAILY
Qty: 90 CAPSULE | Refills: 2 | Status: CANCELLED | OUTPATIENT
Start: 2022-08-19 | End: 2023-08-19

## 2022-09-27 RX ORDER — ALPRAZOLAM 0.25 MG/1
0.25 TABLET ORAL 2 TIMES DAILY PRN
Qty: 30 TABLET | Refills: 1 | Status: SHIPPED | OUTPATIENT
Start: 2022-09-27 | End: 2023-02-15 | Stop reason: SDUPTHER

## 2022-09-27 NOTE — TELEPHONE ENCOUNTER
No new care gaps identified.  [facility] Embedded Care Gaps. Reference number: [id number]. 9/27/2022   2:31:26 PM CDT

## 2022-11-10 DIAGNOSIS — N76.2 ACUTE VULVITIS: ICD-10-CM

## 2022-11-10 RX ORDER — GABAPENTIN 300 MG/1
300 CAPSULE ORAL 3 TIMES DAILY
Qty: 90 CAPSULE | Refills: 2 | OUTPATIENT
Start: 2022-11-10 | End: 2023-11-10

## 2022-11-10 NOTE — TELEPHONE ENCOUNTER
Medication refused due to failing protocol.    Requested Prescriptions   Pending Prescriptions Disp Refills    gabapentin (NEURONTIN) 300 MG capsule 90 capsule 2     Sig: Take 1 capsule (300 mg total) by mouth 3 (three) times daily.       Anticonvulsants Protocol Failed - 11/10/2022 11:55 AM        Failed - Visit with Authorizing provider in past 9 months or upcoming 90 days        Passed - No active pregnancy on record

## 2022-12-24 NOTE — TELEPHONE ENCOUNTER
No new care gaps identified.  [facility] Embedded Care Gaps. Reference number: [id number]. 12/24/2022   9:52:14 AM CST

## 2022-12-26 RX ORDER — ALPRAZOLAM 0.25 MG/1
0.25 TABLET ORAL 2 TIMES DAILY PRN
Qty: 30 TABLET | Refills: 1 | OUTPATIENT
Start: 2022-12-26 | End: 2023-01-25

## 2023-02-05 DIAGNOSIS — N76.2 ACUTE VULVITIS: ICD-10-CM

## 2023-02-07 RX ORDER — GABAPENTIN 300 MG/1
300 CAPSULE ORAL 3 TIMES DAILY
Qty: 90 CAPSULE | Refills: 2 | Status: SHIPPED | OUTPATIENT
Start: 2023-02-07 | End: 2023-04-25

## 2023-02-15 ENCOUNTER — OFFICE VISIT (OUTPATIENT)
Dept: FAMILY MEDICINE | Facility: CLINIC | Age: 38
End: 2023-02-15
Payer: COMMERCIAL

## 2023-02-15 VITALS
SYSTOLIC BLOOD PRESSURE: 120 MMHG | BODY MASS INDEX: 31.56 KG/M2 | DIASTOLIC BLOOD PRESSURE: 70 MMHG | OXYGEN SATURATION: 99 % | HEART RATE: 97 BPM | HEIGHT: 63 IN | WEIGHT: 178.13 LBS

## 2023-02-15 DIAGNOSIS — Z00.00 ANNUAL PHYSICAL EXAM: Primary | ICD-10-CM

## 2023-02-15 PROCEDURE — 3078F PR MOST RECENT DIASTOLIC BLOOD PRESSURE < 80 MM HG: ICD-10-PCS | Mod: CPTII,S$GLB,, | Performed by: INTERNAL MEDICINE

## 2023-02-15 PROCEDURE — 3074F SYST BP LT 130 MM HG: CPT | Mod: CPTII,S$GLB,, | Performed by: INTERNAL MEDICINE

## 2023-02-15 PROCEDURE — 99999 PR PBB SHADOW E&M-EST. PATIENT-LVL III: CPT | Mod: PBBFAC,,, | Performed by: INTERNAL MEDICINE

## 2023-02-15 PROCEDURE — 99395 PR PREVENTIVE VISIT,EST,18-39: ICD-10-PCS | Mod: S$GLB,,, | Performed by: INTERNAL MEDICINE

## 2023-02-15 PROCEDURE — 99999 PR PBB SHADOW E&M-EST. PATIENT-LVL III: ICD-10-PCS | Mod: PBBFAC,,, | Performed by: INTERNAL MEDICINE

## 2023-02-15 PROCEDURE — 3078F DIAST BP <80 MM HG: CPT | Mod: CPTII,S$GLB,, | Performed by: INTERNAL MEDICINE

## 2023-02-15 PROCEDURE — 1159F MED LIST DOCD IN RCRD: CPT | Mod: CPTII,S$GLB,, | Performed by: INTERNAL MEDICINE

## 2023-02-15 PROCEDURE — 3008F PR BODY MASS INDEX (BMI) DOCUMENTED: ICD-10-PCS | Mod: CPTII,S$GLB,, | Performed by: INTERNAL MEDICINE

## 2023-02-15 PROCEDURE — 99395 PREV VISIT EST AGE 18-39: CPT | Mod: S$GLB,,, | Performed by: INTERNAL MEDICINE

## 2023-02-15 PROCEDURE — 3074F PR MOST RECENT SYSTOLIC BLOOD PRESSURE < 130 MM HG: ICD-10-PCS | Mod: CPTII,S$GLB,, | Performed by: INTERNAL MEDICINE

## 2023-02-15 PROCEDURE — 1159F PR MEDICATION LIST DOCUMENTED IN MEDICAL RECORD: ICD-10-PCS | Mod: CPTII,S$GLB,, | Performed by: INTERNAL MEDICINE

## 2023-02-15 PROCEDURE — 3008F BODY MASS INDEX DOCD: CPT | Mod: CPTII,S$GLB,, | Performed by: INTERNAL MEDICINE

## 2023-02-15 RX ORDER — ALPRAZOLAM 0.25 MG/1
0.25 TABLET ORAL NIGHTLY
Qty: 30 TABLET | Refills: 5 | Status: SHIPPED | OUTPATIENT
Start: 2023-02-15 | End: 2023-08-10 | Stop reason: SDUPTHER

## 2023-02-15 NOTE — PROGRESS NOTES
Subjective:       Patient ID: Katina Cohen is a 37 y.o. female.    Chief Complaint: Annual Exam    Pt here for annual. Due for labs. Exercising. nonsmoker    Review of Systems   Constitutional:  Negative for fever.   Respiratory:  Negative for shortness of breath.    Cardiovascular:  Negative for chest pain.   Neurological:  Negative for headaches.       Objective:      Physical Exam  Constitutional:       Appearance: Normal appearance.   HENT:      Head: Normocephalic.   Cardiovascular:      Rate and Rhythm: Normal rate and regular rhythm.   Pulmonary:      Effort: Pulmonary effort is normal.      Breath sounds: Normal breath sounds.   Musculoskeletal:         General: Normal range of motion.      Cervical back: Normal range of motion.   Neurological:      General: No focal deficit present.      Mental Status: She is alert.   Psychiatric:         Mood and Affect: Mood normal.         Behavior: Behavior normal.       Assessment:       Problem List Items Addressed This Visit    None  Visit Diagnoses       Annual physical exam    -  Primary    Relevant Orders    CBC Auto Differential (Completed)    Lipid Panel (Completed)    Comprehensive Metabolic Panel (Completed)    TSH (Completed)    NICOLAS (Completed)    THYROID PEROXIDASE ANTIBODY (Completed)            Plan:       Annual- check lasb

## 2023-02-18 ENCOUNTER — LAB VISIT (OUTPATIENT)
Dept: LAB | Facility: HOSPITAL | Age: 38
End: 2023-02-18
Attending: INTERNAL MEDICINE
Payer: COMMERCIAL

## 2023-02-18 DIAGNOSIS — Z00.00 ANNUAL PHYSICAL EXAM: ICD-10-CM

## 2023-02-18 LAB
ALBUMIN SERPL BCP-MCNC: 4.1 G/DL (ref 3.5–5.2)
ALP SERPL-CCNC: 65 U/L (ref 55–135)
ALT SERPL W/O P-5'-P-CCNC: 36 U/L (ref 10–44)
ANION GAP SERPL CALC-SCNC: 9 MMOL/L (ref 8–16)
AST SERPL-CCNC: 29 U/L (ref 10–40)
BASOPHILS # BLD AUTO: 0.04 K/UL (ref 0–0.2)
BASOPHILS NFR BLD: 0.8 % (ref 0–1.9)
BILIRUB SERPL-MCNC: 0.3 MG/DL (ref 0.1–1)
BUN SERPL-MCNC: 15 MG/DL (ref 6–20)
CALCIUM SERPL-MCNC: 9.5 MG/DL (ref 8.7–10.5)
CHLORIDE SERPL-SCNC: 105 MMOL/L (ref 95–110)
CHOLEST SERPL-MCNC: 215 MG/DL (ref 120–199)
CHOLEST/HDLC SERPL: 3.4 {RATIO} (ref 2–5)
CO2 SERPL-SCNC: 24 MMOL/L (ref 23–29)
CREAT SERPL-MCNC: 0.8 MG/DL (ref 0.5–1.4)
DIFFERENTIAL METHOD: ABNORMAL
EOSINOPHIL # BLD AUTO: 0.1 K/UL (ref 0–0.5)
EOSINOPHIL NFR BLD: 1.9 % (ref 0–8)
ERYTHROCYTE [DISTWIDTH] IN BLOOD BY AUTOMATED COUNT: 16 % (ref 11.5–14.5)
EST. GFR  (NO RACE VARIABLE): >60 ML/MIN/1.73 M^2
GLUCOSE SERPL-MCNC: 96 MG/DL (ref 70–110)
HCT VFR BLD AUTO: 29.7 % (ref 37–48.5)
HDLC SERPL-MCNC: 64 MG/DL (ref 40–75)
HDLC SERPL: 29.8 % (ref 20–50)
HGB BLD-MCNC: 8.5 G/DL (ref 12–16)
IMM GRANULOCYTES # BLD AUTO: 0.01 K/UL (ref 0–0.04)
IMM GRANULOCYTES NFR BLD AUTO: 0.2 % (ref 0–0.5)
LDLC SERPL CALC-MCNC: 135.8 MG/DL (ref 63–159)
LYMPHOCYTES # BLD AUTO: 1.6 K/UL (ref 1–4.8)
LYMPHOCYTES NFR BLD: 33.7 % (ref 18–48)
MCH RBC QN AUTO: 22.4 PG (ref 27–31)
MCHC RBC AUTO-ENTMCNC: 28.6 G/DL (ref 32–36)
MCV RBC AUTO: 78 FL (ref 82–98)
MONOCYTES # BLD AUTO: 0.4 K/UL (ref 0.3–1)
MONOCYTES NFR BLD: 7.9 % (ref 4–15)
NEUTROPHILS # BLD AUTO: 2.7 K/UL (ref 1.8–7.7)
NEUTROPHILS NFR BLD: 55.5 % (ref 38–73)
NONHDLC SERPL-MCNC: 151 MG/DL
NRBC BLD-RTO: 0 /100 WBC
PLATELET # BLD AUTO: 307 K/UL (ref 150–450)
PMV BLD AUTO: 11.4 FL (ref 9.2–12.9)
POTASSIUM SERPL-SCNC: 4.4 MMOL/L (ref 3.5–5.1)
PROT SERPL-MCNC: 8 G/DL (ref 6–8.4)
RBC # BLD AUTO: 3.8 M/UL (ref 4–5.4)
SODIUM SERPL-SCNC: 138 MMOL/L (ref 136–145)
TRIGL SERPL-MCNC: 76 MG/DL (ref 30–150)
TSH SERPL DL<=0.005 MIU/L-ACNC: 1.58 UIU/ML (ref 0.4–4)
WBC # BLD AUTO: 4.78 K/UL (ref 3.9–12.7)

## 2023-02-18 PROCEDURE — 80061 LIPID PANEL: CPT | Performed by: INTERNAL MEDICINE

## 2023-02-18 PROCEDURE — 86376 MICROSOMAL ANTIBODY EACH: CPT | Performed by: INTERNAL MEDICINE

## 2023-02-18 PROCEDURE — 84443 ASSAY THYROID STIM HORMONE: CPT | Performed by: INTERNAL MEDICINE

## 2023-02-18 PROCEDURE — 85025 COMPLETE CBC W/AUTO DIFF WBC: CPT | Performed by: INTERNAL MEDICINE

## 2023-02-18 PROCEDURE — 86038 ANTINUCLEAR ANTIBODIES: CPT | Performed by: INTERNAL MEDICINE

## 2023-02-18 PROCEDURE — 80053 COMPREHEN METABOLIC PANEL: CPT | Performed by: INTERNAL MEDICINE

## 2023-02-18 PROCEDURE — 36415 COLL VENOUS BLD VENIPUNCTURE: CPT | Mod: PO | Performed by: INTERNAL MEDICINE

## 2023-02-20 ENCOUNTER — PATIENT MESSAGE (OUTPATIENT)
Dept: FAMILY MEDICINE | Facility: CLINIC | Age: 38
End: 2023-02-20
Payer: COMMERCIAL

## 2023-02-20 LAB
ANA SER QL IF: NORMAL
THYROPEROXIDASE IGG SERPL-ACNC: <6 IU/ML

## 2023-02-23 ENCOUNTER — PATIENT MESSAGE (OUTPATIENT)
Dept: FAMILY MEDICINE | Facility: CLINIC | Age: 38
End: 2023-02-23
Payer: COMMERCIAL

## 2023-02-28 ENCOUNTER — PATIENT MESSAGE (OUTPATIENT)
Dept: OBSTETRICS AND GYNECOLOGY | Facility: CLINIC | Age: 38
End: 2023-02-28
Payer: COMMERCIAL

## 2023-04-25 ENCOUNTER — OFFICE VISIT (OUTPATIENT)
Dept: OBSTETRICS AND GYNECOLOGY | Facility: CLINIC | Age: 38
End: 2023-04-25
Payer: COMMERCIAL

## 2023-04-25 VITALS
BODY MASS INDEX: 31.02 KG/M2 | HEART RATE: 93 BPM | DIASTOLIC BLOOD PRESSURE: 82 MMHG | WEIGHT: 175.06 LBS | HEIGHT: 63 IN | SYSTOLIC BLOOD PRESSURE: 164 MMHG

## 2023-04-25 DIAGNOSIS — R10.2 PELVIC PAIN: ICD-10-CM

## 2023-04-25 DIAGNOSIS — Z15.89 METHYLENETETRAHYDROFOLATE REDUCTASE (MTHFR) GENE MUTATION: ICD-10-CM

## 2023-04-25 DIAGNOSIS — D64.9 ANEMIA, UNSPECIFIED TYPE: ICD-10-CM

## 2023-04-25 DIAGNOSIS — N92.0 MENORRHAGIA WITH REGULAR CYCLE: Primary | ICD-10-CM

## 2023-04-25 PROCEDURE — 99999 PR PBB SHADOW E&M-EST. PATIENT-LVL II: CPT | Mod: PBBFAC,,, | Performed by: OBSTETRICS & GYNECOLOGY

## 2023-04-25 PROCEDURE — 3077F SYST BP >= 140 MM HG: CPT | Mod: CPTII,S$GLB,, | Performed by: OBSTETRICS & GYNECOLOGY

## 2023-04-25 PROCEDURE — 3008F BODY MASS INDEX DOCD: CPT | Mod: CPTII,S$GLB,, | Performed by: OBSTETRICS & GYNECOLOGY

## 2023-04-25 PROCEDURE — 99999 PR PBB SHADOW E&M-EST. PATIENT-LVL II: ICD-10-PCS | Mod: PBBFAC,,, | Performed by: OBSTETRICS & GYNECOLOGY

## 2023-04-25 PROCEDURE — 99214 PR OFFICE/OUTPT VISIT, EST, LEVL IV, 30-39 MIN: ICD-10-PCS | Mod: S$GLB,,, | Performed by: OBSTETRICS & GYNECOLOGY

## 2023-04-25 PROCEDURE — 3079F DIAST BP 80-89 MM HG: CPT | Mod: CPTII,S$GLB,, | Performed by: OBSTETRICS & GYNECOLOGY

## 2023-04-25 PROCEDURE — 3079F PR MOST RECENT DIASTOLIC BLOOD PRESSURE 80-89 MM HG: ICD-10-PCS | Mod: CPTII,S$GLB,, | Performed by: OBSTETRICS & GYNECOLOGY

## 2023-04-25 PROCEDURE — 3077F PR MOST RECENT SYSTOLIC BLOOD PRESSURE >= 140 MM HG: ICD-10-PCS | Mod: CPTII,S$GLB,, | Performed by: OBSTETRICS & GYNECOLOGY

## 2023-04-25 PROCEDURE — 99214 OFFICE O/P EST MOD 30 MIN: CPT | Mod: S$GLB,,, | Performed by: OBSTETRICS & GYNECOLOGY

## 2023-04-25 PROCEDURE — 3008F PR BODY MASS INDEX (BMI) DOCUMENTED: ICD-10-PCS | Mod: CPTII,S$GLB,, | Performed by: OBSTETRICS & GYNECOLOGY

## 2023-04-25 RX ORDER — TRANEXAMIC ACID 650 MG/1
1300 TABLET ORAL 3 TIMES DAILY
Qty: 12 TABLET | Refills: 11 | Status: SHIPPED | OUTPATIENT
Start: 2023-04-25

## 2023-04-25 RX ORDER — GABAPENTIN 300 MG/1
600 CAPSULE ORAL NIGHTLY
Qty: 60 CAPSULE | Refills: 11 | Status: SHIPPED | OUTPATIENT
Start: 2023-04-25 | End: 2024-04-24

## 2023-04-25 NOTE — PROGRESS NOTES
Chief Complaint   Patient presents with    Menorrhagia       History of Present Illness: Katina Cohen is a 38 y.o. female that presents today 2023 for   Chief Complaint   Patient presents with    Menorrhagia     She reports recent diagnosis with anemia.  She reports 10 days.  She reports last 3 days spotting.  HCT 29 and she has started iron.  She reports predictable bleeding. She reports day 2-3 very heavy with pad changes every 1-2 hours using 2  at a time.  She report stopping her OCPS 2 years ago.   she    Past Medical History:   Diagnosis Date    Acid reflux     IBS (irritable bowel syndrome)        Past Surgical History:   Procedure Laterality Date     SECTION      3/26/12 at 35 weeks for  labor    FETAL MYELOMENINGOCELE REPAIR HYSTEROTOMY      left arm for birth related brachial plexus injury      TUBAL LIGATION  2014       Current Outpatient Medications   Medication Sig Dispense Refill    ALPRAZolam (XANAX) 0.25 MG tablet Take 1 tablet (0.25 mg total) by mouth every evening. 30 tablet 5    diphenhydrAMINE (BENADRYL) 25 mg capsule Take 25 mg by mouth every evening.      doxycycline (PERIOSTAT) 20 MG tablet Take one tablet by mouth twice daily 60 tablet 2    gabapentin (NEURONTIN) 300 MG capsule Take 2 capsules (600 mg total) by mouth every evening. 60 capsule 11    tranexamic acid (LYSTEDA) 650 mg tablet Take 2 tablets (1,300 mg total) by mouth 3 (three) times daily. 12 tablet 11    triamcinolone acetonide 0.1% (KENALOG) 0.1 % cream Apply twice daily to affected areas up to 2 weeks and then as needed. 30 g 1     No current facility-administered medications for this visit.       Review of patient's allergies indicates:   Allergen Reactions    Sulfa (sulfonamide antibiotics)        Family History   Problem Relation Age of Onset    Spina bifida Son     Breast cancer Neg Hx     Ovarian cancer Neg Hx        Social History     Tobacco Use    Smoking status: Never    Smokeless  "tobacco: Never   Substance Use Topics    Alcohol use: No    Drug use: No       OB History    Para Term  AB Living   2 2 1 1   2   SAB IAB Ectopic Multiple Live Births           2      # Outcome Date GA Lbr Jewel/2nd Weight Sex Delivery Anes PTL Lv   2 Term 06/23/15 37w0d  2.807 kg (6 lb 3 oz) M CS-LTranv EPI N JAVAD   1  12 35w0d  2.381 kg (5 lb 4 oz) M CS-LTranv Spinal Y JAVAD      Birth Comments: with second trimester fetal surgery posterior hysterotomy repair          BP (!) 164/82   Pulse 93   Ht 5' 3" (1.6 m)   Wt 79.4 kg (175 lb 0.7 oz)       ASSESSMENT/PLAN:  Menorrhagia with regular cycle  Comments:  lysteda, OCPS, mirena, ablation, and hyst discussed  she will consider lysteda  we discussed risks of ablation and hyst  Orders:  -     US Pelvis Comp with Transvag NON-OB (xpd; Future; Expected date: 2023  -     tranexamic acid (LYSTEDA) 650 mg tablet; Take 2 tablets (1,300 mg total) by mouth 3 (three) times daily.  Dispense: 12 tablet; Refill: 11    Methylenetetrahydrofolate reductase (MTHFR) gene mutation    Anemia, unspecified type  -     US Pelvis Comp with Transvag NON-OB (xpd; Future; Expected date: 2023  -     tranexamic acid (LYSTEDA) 650 mg tablet; Take 2 tablets (1,300 mg total) by mouth 3 (three) times daily.  Dispense: 12 tablet; Refill: 11    Pelvic pain  -     gabapentin (NEURONTIN) 300 MG capsule; Take 2 capsules (600 mg total) by mouth every evening.  Dispense: 60 capsule; Refill: 11        30 minutes spent today spent preparing reviewing previous external notes, reviewing previous results, performing medical examination, ordering tests or medications, counseling and documenting.     "

## 2023-08-10 RX ORDER — ALPRAZOLAM 0.25 MG/1
0.25 TABLET ORAL NIGHTLY
Qty: 30 TABLET | Refills: 5 | Status: SHIPPED | OUTPATIENT
Start: 2023-08-10 | End: 2024-02-06

## 2023-08-10 NOTE — TELEPHONE ENCOUNTER
No care due was identified.  [facility] Embedded Care Due Messages. Reference number: [id number].   8/10/2023 5:08:08 AM CDT

## 2023-08-14 ENCOUNTER — PATIENT MESSAGE (OUTPATIENT)
Dept: FAMILY MEDICINE | Facility: CLINIC | Age: 38
End: 2023-08-14
Payer: COMMERCIAL

## 2023-08-17 ENCOUNTER — OFFICE VISIT (OUTPATIENT)
Dept: FAMILY MEDICINE | Facility: CLINIC | Age: 38
End: 2023-08-17
Payer: COMMERCIAL

## 2023-08-17 ENCOUNTER — LAB VISIT (OUTPATIENT)
Dept: LAB | Facility: HOSPITAL | Age: 38
End: 2023-08-17
Attending: INTERNAL MEDICINE
Payer: COMMERCIAL

## 2023-08-17 VITALS
WEIGHT: 171.31 LBS | SYSTOLIC BLOOD PRESSURE: 134 MMHG | DIASTOLIC BLOOD PRESSURE: 84 MMHG | RESPIRATION RATE: 18 BRPM | OXYGEN SATURATION: 98 % | BODY MASS INDEX: 30.34 KG/M2 | HEART RATE: 100 BPM

## 2023-08-17 DIAGNOSIS — D64.9 ANEMIA, UNSPECIFIED TYPE: Primary | ICD-10-CM

## 2023-08-17 DIAGNOSIS — D64.9 ANEMIA, UNSPECIFIED TYPE: ICD-10-CM

## 2023-08-17 DIAGNOSIS — R00.2 PALPITATION: ICD-10-CM

## 2023-08-17 LAB
BASOPHILS # BLD AUTO: 0.03 K/UL (ref 0–0.2)
BASOPHILS NFR BLD: 0.6 % (ref 0–1.9)
DIFFERENTIAL METHOD: ABNORMAL
EOSINOPHIL # BLD AUTO: 0.1 K/UL (ref 0–0.5)
EOSINOPHIL NFR BLD: 2 % (ref 0–8)
ERYTHROCYTE [DISTWIDTH] IN BLOOD BY AUTOMATED COUNT: 13.2 % (ref 11.5–14.5)
HCT VFR BLD AUTO: 33.5 % (ref 37–48.5)
HGB BLD-MCNC: 10.4 G/DL (ref 12–16)
IMM GRANULOCYTES # BLD AUTO: 0.01 K/UL (ref 0–0.04)
IMM GRANULOCYTES NFR BLD AUTO: 0.2 % (ref 0–0.5)
LYMPHOCYTES # BLD AUTO: 1.6 K/UL (ref 1–4.8)
LYMPHOCYTES NFR BLD: 31.8 % (ref 18–48)
MCH RBC QN AUTO: 25.5 PG (ref 27–31)
MCHC RBC AUTO-ENTMCNC: 31 G/DL (ref 32–36)
MCV RBC AUTO: 82 FL (ref 82–98)
MONOCYTES # BLD AUTO: 0.4 K/UL (ref 0.3–1)
MONOCYTES NFR BLD: 7.2 % (ref 4–15)
NEUTROPHILS # BLD AUTO: 2.8 K/UL (ref 1.8–7.7)
NEUTROPHILS NFR BLD: 58.2 % (ref 38–73)
NRBC BLD-RTO: 0 /100 WBC
PLATELET # BLD AUTO: 347 K/UL (ref 150–450)
PMV BLD AUTO: 10.6 FL (ref 9.2–12.9)
RBC # BLD AUTO: 4.08 M/UL (ref 4–5.4)
WBC # BLD AUTO: 4.88 K/UL (ref 3.9–12.7)

## 2023-08-17 PROCEDURE — 3079F PR MOST RECENT DIASTOLIC BLOOD PRESSURE 80-89 MM HG: ICD-10-PCS | Mod: CPTII,S$GLB,, | Performed by: INTERNAL MEDICINE

## 2023-08-17 PROCEDURE — 99999 PR PBB SHADOW E&M-EST. PATIENT-LVL III: ICD-10-PCS | Mod: PBBFAC,,, | Performed by: INTERNAL MEDICINE

## 2023-08-17 PROCEDURE — 3075F SYST BP GE 130 - 139MM HG: CPT | Mod: CPTII,S$GLB,, | Performed by: INTERNAL MEDICINE

## 2023-08-17 PROCEDURE — 1159F PR MEDICATION LIST DOCUMENTED IN MEDICAL RECORD: ICD-10-PCS | Mod: CPTII,S$GLB,, | Performed by: INTERNAL MEDICINE

## 2023-08-17 PROCEDURE — 36415 COLL VENOUS BLD VENIPUNCTURE: CPT | Mod: PO | Performed by: INTERNAL MEDICINE

## 2023-08-17 PROCEDURE — 3075F PR MOST RECENT SYSTOLIC BLOOD PRESS GE 130-139MM HG: ICD-10-PCS | Mod: CPTII,S$GLB,, | Performed by: INTERNAL MEDICINE

## 2023-08-17 PROCEDURE — 3008F PR BODY MASS INDEX (BMI) DOCUMENTED: ICD-10-PCS | Mod: CPTII,S$GLB,, | Performed by: INTERNAL MEDICINE

## 2023-08-17 PROCEDURE — 3079F DIAST BP 80-89 MM HG: CPT | Mod: CPTII,S$GLB,, | Performed by: INTERNAL MEDICINE

## 2023-08-17 PROCEDURE — 3008F BODY MASS INDEX DOCD: CPT | Mod: CPTII,S$GLB,, | Performed by: INTERNAL MEDICINE

## 2023-08-17 PROCEDURE — 99999 PR PBB SHADOW E&M-EST. PATIENT-LVL III: CPT | Mod: PBBFAC,,, | Performed by: INTERNAL MEDICINE

## 2023-08-17 PROCEDURE — 84466 ASSAY OF TRANSFERRIN: CPT | Performed by: INTERNAL MEDICINE

## 2023-08-17 PROCEDURE — 85025 COMPLETE CBC W/AUTO DIFF WBC: CPT | Performed by: INTERNAL MEDICINE

## 2023-08-17 PROCEDURE — 1159F MED LIST DOCD IN RCRD: CPT | Mod: CPTII,S$GLB,, | Performed by: INTERNAL MEDICINE

## 2023-08-17 PROCEDURE — 99213 OFFICE O/P EST LOW 20 MIN: CPT | Mod: S$GLB,,, | Performed by: INTERNAL MEDICINE

## 2023-08-17 PROCEDURE — 99213 PR OFFICE/OUTPT VISIT, EST, LEVL III, 20-29 MIN: ICD-10-PCS | Mod: S$GLB,,, | Performed by: INTERNAL MEDICINE

## 2023-08-17 NOTE — PROGRESS NOTES
Subjective     Patient ID: Katina Cohen is a 38 y.o. female.    Chief Complaint: Palpitations    Pt here for palpitations. She says they are constant. Not sure if anxiety related. Taking a xanax does not help    Shortness of Breath  This is a new problem. The current episode started 1 to 4 weeks ago. The problem occurs daily. The problem has been gradually worsening. The average episode lasts 2 hours. Associated symptoms include orthopnea. Pertinent negatives include no abdominal pain, chest pain, claudication, coryza, ear pain, fever, headaches, hemoptysis, leg pain, leg swelling, neck pain, PND, rash, rhinorrhea, sore throat, sputum production, swollen glands, syncope, vomiting or wheezing. The symptoms are aggravated by emotional upset and lying flat. The patient has no known risk factors for DVT/PE. The treatment provided no relief. There is no history of allergies, aspirin allergies, asthma, bronchiolitis, CAD, chronic lung disease, COPD, DVT, a heart failure, PE, pneumonia or a recent surgery.     Review of Systems   Constitutional:  Negative for fever.   HENT:  Negative for ear pain, rhinorrhea and sore throat.    Respiratory:  Positive for shortness of breath. Negative for hemoptysis, sputum production and wheezing.    Cardiovascular:  Positive for orthopnea. Negative for chest pain, claudication, leg swelling, syncope and PND.   Gastrointestinal:  Negative for abdominal pain and vomiting.   Musculoskeletal:  Negative for leg pain and neck pain.   Integumentary:  Negative for rash.   Neurological:  Negative for headaches.          Objective     Physical Exam  Constitutional:       Appearance: Normal appearance.   HENT:      Head: Normocephalic.   Cardiovascular:      Rate and Rhythm: Normal rate and regular rhythm.   Pulmonary:      Effort: Pulmonary effort is normal.      Breath sounds: Normal breath sounds.   Musculoskeletal:         General: Normal range of motion.      Cervical back: Normal  range of motion.   Neurological:      General: No focal deficit present.      Mental Status: She is alert.   Psychiatric:         Mood and Affect: Mood normal.         Behavior: Behavior normal.            Assessment and Plan     1. Anemia, unspecified type  -     CBC Auto Differential; Future; Expected date: 08/17/2023  -     Iron and TIBC; Future; Expected date: 08/17/2023  -     FERRITIN; Future; Expected date: 08/17/2023    2. Palpitation  -     Holter monitor - 24 hour; Future                 Follow up in about 4 weeks (around 9/14/2023).

## 2023-08-18 LAB
IRON SERPL-MCNC: 46 UG/DL (ref 30–160)
SATURATED IRON: 8 % (ref 20–50)
TOTAL IRON BINDING CAPACITY: 543 UG/DL (ref 250–450)
TRANSFERRIN SERPL-MCNC: 367 MG/DL (ref 200–375)

## 2023-08-22 ENCOUNTER — PATIENT MESSAGE (OUTPATIENT)
Dept: FAMILY MEDICINE | Facility: CLINIC | Age: 38
End: 2023-08-22
Payer: COMMERCIAL

## 2024-05-15 DIAGNOSIS — R10.2 PELVIC PAIN: ICD-10-CM

## 2024-05-15 RX ORDER — GABAPENTIN 300 MG/1
600 CAPSULE ORAL NIGHTLY
Qty: 60 CAPSULE | Refills: 11 | OUTPATIENT
Start: 2024-05-15 | End: 2025-05-15

## 2024-05-15 RX ORDER — GABAPENTIN 300 MG/1
600 CAPSULE ORAL NIGHTLY
Qty: 60 CAPSULE | Refills: 11 | Status: CANCELLED | OUTPATIENT
Start: 2024-05-15 | End: 2025-05-15

## 2024-05-15 NOTE — TELEPHONE ENCOUNTER
Refill Routing Note   Medication(s) are not appropriate for processing by [facility] for the following reason(s):        Outside of protocol    [facility] action(s):  Route               Appointments  past 12m or future 3m with PCP    Date Provider   Last Visit   4/25/2023 Shavon Mascorro MD   Next Visit   Visit date not found Shavon Mascorro MD   ED visits in past 90 days: 0        Note composed:6:53 AM 05/15/2024

## 2024-05-16 NOTE — TELEPHONE ENCOUNTER
Medication refused due to failing protocol.    Requested Prescriptions   Pending Prescriptions Disp Refills    gabapentin (NEURONTIN) 300 MG capsule 60 capsule 11     Sig: Take 2 capsules (600 mg total) by mouth every evening.       Neurology: Anticonvulsants - gabapentin Failed - 5/15/2024 12:27 PM        Failed - Valid OB/GYN Encounter within 12 months     Recent Visits  Date Type Provider Dept   04/25/23 Office Visit Shavon Mascorro MD [facility]   Showing recent visits within past 720 days and meeting all other requirements  Future Appointments  No visits were found meeting these conditions.  Showing future appointments within next 150 days and meeting all other requirements                Failed - This refill cannot be delegated        Failed - Cr is 1.4 or below and within 360 days     Lab Results   Component Value Date    CREATININE 0.8 02/18/2023    CREATININE 0.8 09/10/2019              Failed - eGFR within 360 days     Lab Results   Component Value Date    EGFRNORACEVR >60.0 02/18/2023    EGFRNONAA >60.0 09/10/2019                Passed - Matches previous order       Previous Authorizing Provider: Shavon Mascorro MD (gabapentin (NEURONTIN) 300 MG capsule)  Previous Pharmacy: [facility]

## 2024-05-19 DIAGNOSIS — R10.2 PELVIC PAIN: ICD-10-CM

## 2024-05-20 RX ORDER — GABAPENTIN 300 MG/1
600 CAPSULE ORAL NIGHTLY
Qty: 60 CAPSULE | Refills: 11 | OUTPATIENT
Start: 2024-05-20 | End: 2025-05-20

## 2024-06-26 ENCOUNTER — LAB VISIT (OUTPATIENT)
Dept: LAB | Facility: HOSPITAL | Age: 39
End: 2024-06-26
Attending: INTERNAL MEDICINE

## 2024-06-26 ENCOUNTER — OFFICE VISIT (OUTPATIENT)
Dept: FAMILY MEDICINE | Facility: CLINIC | Age: 39
End: 2024-06-26

## 2024-06-26 VITALS
OXYGEN SATURATION: 98 % | BODY MASS INDEX: 30.79 KG/M2 | SYSTOLIC BLOOD PRESSURE: 126 MMHG | HEART RATE: 86 BPM | HEIGHT: 63 IN | DIASTOLIC BLOOD PRESSURE: 82 MMHG | WEIGHT: 173.75 LBS

## 2024-06-26 DIAGNOSIS — R10.2 PELVIC PAIN: ICD-10-CM

## 2024-06-26 DIAGNOSIS — Z00.00 ANNUAL PHYSICAL EXAM: Primary | ICD-10-CM

## 2024-06-26 DIAGNOSIS — Z00.00 ANNUAL PHYSICAL EXAM: ICD-10-CM

## 2024-06-26 LAB
ALBUMIN SERPL BCP-MCNC: 4 G/DL (ref 3.5–5.2)
ALP SERPL-CCNC: 53 U/L (ref 55–135)
ALT SERPL W/O P-5'-P-CCNC: 17 U/L (ref 10–44)
ANION GAP SERPL CALC-SCNC: 9 MMOL/L (ref 8–16)
AST SERPL-CCNC: 18 U/L (ref 10–40)
BASOPHILS # BLD AUTO: 0.04 K/UL (ref 0–0.2)
BASOPHILS NFR BLD: 0.7 % (ref 0–1.9)
BILIRUB SERPL-MCNC: 0.3 MG/DL (ref 0.1–1)
BUN SERPL-MCNC: 15 MG/DL (ref 6–20)
CALCIUM SERPL-MCNC: 9.5 MG/DL (ref 8.7–10.5)
CHLORIDE SERPL-SCNC: 105 MMOL/L (ref 95–110)
CO2 SERPL-SCNC: 23 MMOL/L (ref 23–29)
CREAT SERPL-MCNC: 0.9 MG/DL (ref 0.5–1.4)
DIFFERENTIAL METHOD BLD: ABNORMAL
EOSINOPHIL # BLD AUTO: 0.1 K/UL (ref 0–0.5)
EOSINOPHIL NFR BLD: 1 % (ref 0–8)
ERYTHROCYTE [DISTWIDTH] IN BLOOD BY AUTOMATED COUNT: 14.8 % (ref 11.5–14.5)
EST. GFR  (NO RACE VARIABLE): >60 ML/MIN/1.73 M^2
ESTIMATED AVG GLUCOSE: 108 MG/DL (ref 68–131)
FERRITIN SERPL-MCNC: 3 NG/ML (ref 20–300)
GLUCOSE SERPL-MCNC: 117 MG/DL (ref 70–110)
HBA1C MFR BLD: 5.4 % (ref 4–5.6)
HCT VFR BLD AUTO: 31.4 % (ref 37–48.5)
HGB BLD-MCNC: 9.6 G/DL (ref 12–16)
IMM GRANULOCYTES # BLD AUTO: 0.02 K/UL (ref 0–0.04)
IMM GRANULOCYTES NFR BLD AUTO: 0.3 % (ref 0–0.5)
IRON SERPL-MCNC: 26 UG/DL (ref 30–160)
LYMPHOCYTES # BLD AUTO: 1.5 K/UL (ref 1–4.8)
LYMPHOCYTES NFR BLD: 24.9 % (ref 18–48)
MCH RBC QN AUTO: 24.4 PG (ref 27–31)
MCHC RBC AUTO-ENTMCNC: 30.6 G/DL (ref 32–36)
MCV RBC AUTO: 80 FL (ref 82–98)
MONOCYTES # BLD AUTO: 0.4 K/UL (ref 0.3–1)
MONOCYTES NFR BLD: 6.4 % (ref 4–15)
NEUTROPHILS # BLD AUTO: 3.9 K/UL (ref 1.8–7.7)
NEUTROPHILS NFR BLD: 66.7 % (ref 38–73)
NRBC BLD-RTO: 0 /100 WBC
PLATELET # BLD AUTO: 305 K/UL (ref 150–450)
PMV BLD AUTO: 11.3 FL (ref 9.2–12.9)
POTASSIUM SERPL-SCNC: 4.4 MMOL/L (ref 3.5–5.1)
PROT SERPL-MCNC: 7.8 G/DL (ref 6–8.4)
RBC # BLD AUTO: 3.93 M/UL (ref 4–5.4)
SATURATED IRON: 5 % (ref 20–50)
SODIUM SERPL-SCNC: 137 MMOL/L (ref 136–145)
T4 FREE SERPL-MCNC: 0.77 NG/DL (ref 0.71–1.51)
TOTAL IRON BINDING CAPACITY: 564 UG/DL (ref 250–450)
TRANSFERRIN SERPL-MCNC: 381 MG/DL (ref 200–375)
TSH SERPL DL<=0.005 MIU/L-ACNC: 1.09 UIU/ML (ref 0.4–4)
WBC # BLD AUTO: 5.9 K/UL (ref 3.9–12.7)

## 2024-06-26 PROCEDURE — 83540 ASSAY OF IRON: CPT | Performed by: INTERNAL MEDICINE

## 2024-06-26 PROCEDURE — 80053 COMPREHEN METABOLIC PANEL: CPT | Performed by: INTERNAL MEDICINE

## 2024-06-26 PROCEDURE — 85025 COMPLETE CBC W/AUTO DIFF WBC: CPT | Performed by: INTERNAL MEDICINE

## 2024-06-26 PROCEDURE — 99213 OFFICE O/P EST LOW 20 MIN: CPT | Mod: PBBFAC,PO | Performed by: INTERNAL MEDICINE

## 2024-06-26 PROCEDURE — 99999 PR PBB SHADOW E&M-EST. PATIENT-LVL III: CPT | Mod: PBBFAC,,, | Performed by: INTERNAL MEDICINE

## 2024-06-26 PROCEDURE — 36415 COLL VENOUS BLD VENIPUNCTURE: CPT | Mod: PO | Performed by: INTERNAL MEDICINE

## 2024-06-26 PROCEDURE — 84443 ASSAY THYROID STIM HORMONE: CPT | Performed by: INTERNAL MEDICINE

## 2024-06-26 PROCEDURE — 83036 HEMOGLOBIN GLYCOSYLATED A1C: CPT | Performed by: INTERNAL MEDICINE

## 2024-06-26 PROCEDURE — 99395 PREV VISIT EST AGE 18-39: CPT | Mod: S$GLB,,, | Performed by: INTERNAL MEDICINE

## 2024-06-26 PROCEDURE — 84439 ASSAY OF FREE THYROXINE: CPT | Performed by: INTERNAL MEDICINE

## 2024-06-26 PROCEDURE — 82728 ASSAY OF FERRITIN: CPT | Performed by: INTERNAL MEDICINE

## 2024-06-26 RX ORDER — GABAPENTIN 300 MG/1
600 CAPSULE ORAL NIGHTLY
Qty: 60 CAPSULE | Refills: 11 | Status: SHIPPED | OUTPATIENT
Start: 2024-06-26 | End: 2025-06-26

## 2024-06-26 NOTE — PROGRESS NOTES
Subjective     Patient ID: Katina Cohen is a 39 y.o. female.    Chief Complaint: Annual Exam    Pt here for annual. Due labs. Utd gyn. Nonsmoker. Exericsing       Review of Systems   Constitutional:  Negative for fever.   Respiratory:  Negative for shortness of breath.    Cardiovascular:  Negative for chest pain.   Neurological:  Negative for headaches.          Objective     Physical Exam  Constitutional:       Appearance: Normal appearance.   HENT:      Head: Normocephalic.   Cardiovascular:      Rate and Rhythm: Normal rate and regular rhythm.   Pulmonary:      Effort: Pulmonary effort is normal.      Breath sounds: Normal breath sounds.   Musculoskeletal:         General: Normal range of motion.      Cervical back: Normal range of motion.   Neurological:      General: No focal deficit present.      Mental Status: She is alert.   Psychiatric:         Mood and Affect: Mood normal.         Behavior: Behavior normal.            Assessment and Plan     1. Annual physical exam  -     CBC Auto Differential; Future; Expected date: 06/26/2024  -     Comprehensive Metabolic Panel; Future; Expected date: 06/26/2024  -     TSH; Future; Expected date: 06/26/2024  -     Iron and TIBC; Future; Expected date: 06/26/2024  -     FERRITIN; Future; Expected date: 06/26/2024  -     T4, Free; Future; Expected date: 06/26/2024  -     Hemoglobin A1C; Future; Expected date: 06/26/2024    2. Pelvic pain  -     gabapentin (NEURONTIN) 300 MG capsule; Take 2 capsules (600 mg total) by mouth every evening.  Dispense: 60 capsule; Refill: 11        Annual- check labs         No follow-ups on file.

## 2024-06-27 ENCOUNTER — PATIENT MESSAGE (OUTPATIENT)
Dept: FAMILY MEDICINE | Facility: CLINIC | Age: 39
End: 2024-06-27

## 2024-08-09 ENCOUNTER — PATIENT MESSAGE (OUTPATIENT)
Dept: FAMILY MEDICINE | Facility: CLINIC | Age: 39
End: 2024-08-09

## 2024-08-13 ENCOUNTER — TELEPHONE (OUTPATIENT)
Dept: FAMILY MEDICINE | Facility: CLINIC | Age: 39
End: 2024-08-13

## 2024-08-13 ENCOUNTER — CLINICAL SUPPORT (OUTPATIENT)
Dept: FAMILY MEDICINE | Facility: CLINIC | Age: 39
End: 2024-08-13

## 2024-08-13 VITALS — HEART RATE: 83 BPM

## 2024-08-13 DIAGNOSIS — R00.2 PALPITATION: Primary | ICD-10-CM

## 2024-08-13 DIAGNOSIS — D64.9 ANEMIA, UNSPECIFIED TYPE: ICD-10-CM

## 2024-08-13 DIAGNOSIS — Z00.00 ANNUAL PHYSICAL EXAM: Primary | ICD-10-CM

## 2024-08-13 PROCEDURE — 99999 PR PBB SHADOW E&M-EST. PATIENT-LVL I: CPT | Mod: PBBFAC,,,

## 2024-08-13 PROCEDURE — 99499 UNLISTED E&M SERVICE: CPT | Mod: S$PBB,,, | Performed by: INTERNAL MEDICINE

## 2024-08-13 PROCEDURE — 99211 OFF/OP EST MAY X REQ PHY/QHP: CPT | Mod: PBBFAC,PO

## 2024-08-17 ENCOUNTER — LAB VISIT (OUTPATIENT)
Dept: LAB | Facility: HOSPITAL | Age: 39
End: 2024-08-17

## 2024-08-17 DIAGNOSIS — D64.9 ANEMIA, UNSPECIFIED TYPE: ICD-10-CM

## 2024-08-17 LAB
BASOPHILS # BLD AUTO: 0.03 K/UL (ref 0–0.2)
BASOPHILS NFR BLD: 0.6 % (ref 0–1.9)
DIFFERENTIAL METHOD BLD: ABNORMAL
EOSINOPHIL # BLD AUTO: 0.1 K/UL (ref 0–0.5)
EOSINOPHIL NFR BLD: 1.6 % (ref 0–8)
ERYTHROCYTE [DISTWIDTH] IN BLOOD BY AUTOMATED COUNT: 18.2 % (ref 11.5–14.5)
FERRITIN SERPL-MCNC: 10 NG/ML (ref 20–300)
FERRITIN SERPL-MCNC: 10 NG/ML (ref 20–300)
HCT VFR BLD AUTO: 35.4 % (ref 37–48.5)
HGB BLD-MCNC: 10.9 G/DL (ref 12–16)
IMM GRANULOCYTES # BLD AUTO: 0.01 K/UL (ref 0–0.04)
IMM GRANULOCYTES NFR BLD AUTO: 0.2 % (ref 0–0.5)
IRON SERPL-MCNC: 205 UG/DL (ref 30–160)
LYMPHOCYTES # BLD AUTO: 1.7 K/UL (ref 1–4.8)
LYMPHOCYTES NFR BLD: 34.7 % (ref 18–48)
MCH RBC QN AUTO: 24.9 PG (ref 27–31)
MCHC RBC AUTO-ENTMCNC: 30.8 G/DL (ref 32–36)
MCV RBC AUTO: 81 FL (ref 82–98)
MONOCYTES # BLD AUTO: 0.4 K/UL (ref 0.3–1)
MONOCYTES NFR BLD: 7.7 % (ref 4–15)
NEUTROPHILS # BLD AUTO: 2.7 K/UL (ref 1.8–7.7)
NEUTROPHILS NFR BLD: 55.2 % (ref 38–73)
NRBC BLD-RTO: 0 /100 WBC
PLATELET # BLD AUTO: 314 K/UL (ref 150–450)
PMV BLD AUTO: 10.9 FL (ref 9.2–12.9)
RBC # BLD AUTO: 4.38 M/UL (ref 4–5.4)
SATURATED IRON: 39 % (ref 20–50)
TOTAL IRON BINDING CAPACITY: 524 UG/DL (ref 250–450)
TRANSFERRIN SERPL-MCNC: 354 MG/DL (ref 200–375)
WBC # BLD AUTO: 4.93 K/UL (ref 3.9–12.7)

## 2024-08-17 PROCEDURE — 36415 COLL VENOUS BLD VENIPUNCTURE: CPT | Mod: PO | Performed by: INTERNAL MEDICINE

## 2024-08-17 PROCEDURE — 83540 ASSAY OF IRON: CPT | Performed by: INTERNAL MEDICINE

## 2024-08-17 PROCEDURE — 82728 ASSAY OF FERRITIN: CPT | Performed by: INTERNAL MEDICINE

## 2024-08-17 PROCEDURE — 85025 COMPLETE CBC W/AUTO DIFF WBC: CPT | Performed by: INTERNAL MEDICINE

## 2024-11-08 ENCOUNTER — OFFICE VISIT (OUTPATIENT)
Dept: FAMILY MEDICINE | Facility: CLINIC | Age: 39
End: 2024-11-08

## 2024-11-08 VITALS
OXYGEN SATURATION: 100 % | WEIGHT: 164.69 LBS | SYSTOLIC BLOOD PRESSURE: 122 MMHG | HEART RATE: 100 BPM | TEMPERATURE: 98 F | DIASTOLIC BLOOD PRESSURE: 84 MMHG | HEIGHT: 63 IN | BODY MASS INDEX: 29.18 KG/M2

## 2024-11-08 DIAGNOSIS — J06.9 VIRAL URI WITH COUGH: Primary | ICD-10-CM

## 2024-11-08 PROBLEM — M79.676 PAIN AROUND TOENAIL: Status: RESOLVED | Noted: 2021-05-12 | Resolved: 2024-11-08

## 2024-11-08 PROBLEM — B35.1 ONYCHOMYCOSIS DUE TO DERMATOPHYTE: Status: RESOLVED | Noted: 2021-05-12 | Resolved: 2024-11-08

## 2024-11-08 PROBLEM — B35.3 TINEA PEDIS OF LEFT FOOT: Status: RESOLVED | Noted: 2021-05-12 | Resolved: 2024-11-08

## 2024-11-08 PROCEDURE — 99999 PR PBB SHADOW E&M-EST. PATIENT-LVL IV: CPT | Mod: PBBFAC,,, | Performed by: PHYSICIAN ASSISTANT

## 2024-11-08 PROCEDURE — 99214 OFFICE O/P EST MOD 30 MIN: CPT | Mod: PBBFAC,PO | Performed by: PHYSICIAN ASSISTANT

## 2024-11-08 RX ORDER — BENZONATATE 100 MG/1
100 CAPSULE ORAL 3 TIMES DAILY PRN
Qty: 30 CAPSULE | Refills: 0 | Status: SHIPPED | OUTPATIENT
Start: 2024-11-08 | End: 2024-11-18

## 2024-11-08 NOTE — PATIENT INSTRUCTIONS
A few reminders from today:    Try to stop benadryl  Try melatonin 5-10mg at night  Tessalon pearles as needed for cough  Maximum dose 600mg a day  Cough drops as needed  Honey lemon tea  Hot steamy showers/warm compresses over sinuses  Rest, hydrate, eat healthy  Take daily multivitamin, eat plenty of protein to help with strength and healing  Warm salt water gargles as needed for throat irritation  Mucinex cold and flu as needed for congestion/drip      Follow up with me if needed.   Please go to ER/urgent care if after hours or symptoms persist/worsen.       Do not hesitate to get in touch with me should you have any further questions.     Thank you for trusting me with your care!  I wish you health and happiness.    -Lashell Huff PA-C

## 2024-11-08 NOTE — PROGRESS NOTES
Subjective     Patient ID: Katina Cohen is a 39 y.o. female.    Chief Complaint: Cough (X 3 days )      HPI      Pt is new to me, PCP Dr. Taylor.     Pt is a 39 year old female with MTHFR mutation. She presents today complaining of a cough. About 2 days ago, she started with a mild sore throat, dry cough. Had fever the first day with some chills and body aches, now resolved.. No significant nasal congestion, SOB, chest pain. No N/V/D/abdominal pain. Plenty of people in her family have similar symptoms. One of her kids has strep, but pt denies sore throat at this time. Declines swabs because would be out of pocket expense for her.     Review of Systems   All other systems reviewed and are negative.         Objective     Physical Exam  Vitals and nursing note reviewed.   Constitutional:       General: She is not in acute distress.     Appearance: Normal appearance. She is not ill-appearing, toxic-appearing or diaphoretic.   HENT:      Head: Normocephalic and atraumatic.      Right Ear: Tympanic membrane, ear canal and external ear normal. There is no impacted cerumen.      Left Ear: Tympanic membrane, ear canal and external ear normal. There is no impacted cerumen.      Nose: Rhinorrhea (clear) present. No congestion.      Mouth/Throat:      Pharynx: Posterior oropharyngeal erythema (very mild) present. No oropharyngeal exudate.   Eyes:      General: No scleral icterus.        Right eye: No discharge.         Left eye: No discharge.      Conjunctiva/sclera: Conjunctivae normal.      Pupils: Pupils are equal, round, and reactive to light.   Cardiovascular:      Rate and Rhythm: Normal rate and regular rhythm.      Pulses: Normal pulses.      Heart sounds: Normal heart sounds. No murmur heard.     No friction rub. No gallop.   Pulmonary:      Effort: Pulmonary effort is normal. No respiratory distress.      Breath sounds: Normal breath sounds. No stridor. No wheezing, rhonchi or rales.   Musculoskeletal:          General: No deformity or signs of injury.      Cervical back: Normal range of motion and neck supple.      Right lower leg: No edema.      Left lower leg: No edema.   Lymphadenopathy:      Cervical: No cervical adenopathy.   Skin:     General: Skin is warm.      Coloration: Skin is not jaundiced or pale.      Findings: No lesion or rash.   Neurological:      General: No focal deficit present.      Mental Status: She is alert and oriented to person, place, and time. Mental status is at baseline.   Psychiatric:         Mood and Affect: Mood normal.         Behavior: Behavior normal.         Thought Content: Thought content normal.         Judgment: Judgment normal.       1. Viral URI with cough (Primary)  -symptomatic treatment measures discussed  - benzonatate (TESSALON) 100 MG capsule; Take 1 capsule (100 mg total) by mouth 3 (three) times daily as needed for Cough.  Dispense: 30 capsule; Refill: 0    Saw that patient has benadryl listed as a daily med. Takes at night to sleep advised her to switch to something like melatonin.     RTC/ER precautions given. F/U if symptoms persist or worsen    Lashell Huff PA-C

## 2024-11-12 ENCOUNTER — PATIENT MESSAGE (OUTPATIENT)
Dept: FAMILY MEDICINE | Facility: CLINIC | Age: 39
End: 2024-11-12

## 2024-11-12 DIAGNOSIS — B96.89 ACUTE BACTERIAL PHARYNGITIS: Primary | ICD-10-CM

## 2024-11-12 DIAGNOSIS — J02.8 ACUTE BACTERIAL PHARYNGITIS: Primary | ICD-10-CM

## 2024-11-12 RX ORDER — CEFDINIR 300 MG/1
300 CAPSULE ORAL 2 TIMES DAILY
Qty: 14 CAPSULE | Refills: 0 | Status: SHIPPED | OUTPATIENT
Start: 2024-11-12 | End: 2024-11-19

## 2024-11-21 ENCOUNTER — PATIENT MESSAGE (OUTPATIENT)
Dept: FAMILY MEDICINE | Facility: CLINIC | Age: 39
End: 2024-11-21

## 2025-06-08 DIAGNOSIS — R10.2 PELVIC PAIN: ICD-10-CM

## 2025-06-08 NOTE — TELEPHONE ENCOUNTER
No care due was identified.  [facility] Embedded Care Due Messages. Reference number: [id number].   6/08/2025 5:08:05 AM CDT

## 2025-06-09 RX ORDER — GABAPENTIN 300 MG/1
600 CAPSULE ORAL NIGHTLY
Qty: 60 CAPSULE | Refills: 1 | Status: SHIPPED | OUTPATIENT
Start: 2025-06-09 | End: 2026-06-09

## 2025-08-06 DIAGNOSIS — R10.2 PELVIC PAIN: ICD-10-CM

## 2025-08-06 RX ORDER — GABAPENTIN 300 MG/1
600 CAPSULE ORAL NIGHTLY
Qty: 60 CAPSULE | Refills: 1 | Status: SHIPPED | OUTPATIENT
Start: 2025-08-06 | End: 2026-08-06

## 2025-08-06 NOTE — TELEPHONE ENCOUNTER
No care due was identified.  [facility] Embedded Care Due Messages. Reference number: [id number].   8/06/2025 5:08:08 AM CDT